# Patient Record
Sex: MALE | Race: WHITE | Employment: UNEMPLOYED | URBAN - METROPOLITAN AREA
[De-identification: names, ages, dates, MRNs, and addresses within clinical notes are randomized per-mention and may not be internally consistent; named-entity substitution may affect disease eponyms.]

---

## 2023-03-24 ENCOUNTER — APPOINTMENT (OUTPATIENT)
Dept: CT IMAGING | Age: 58
End: 2023-03-24
Payer: MEDICARE

## 2023-03-24 ENCOUNTER — APPOINTMENT (OUTPATIENT)
Dept: GENERAL RADIOLOGY | Age: 58
End: 2023-03-24
Payer: MEDICARE

## 2023-03-24 ENCOUNTER — HOSPITAL ENCOUNTER (OUTPATIENT)
Age: 58
Setting detail: OBSERVATION
LOS: 3 days | Discharge: NURSING, REHAB OR LONG-TERM CARE | End: 2023-03-27
Attending: EMERGENCY MEDICINE | Admitting: INTERNAL MEDICINE
Payer: MEDICARE

## 2023-03-24 DIAGNOSIS — S09.90XA CLOSED HEAD INJURY, INITIAL ENCOUNTER: ICD-10-CM

## 2023-03-24 DIAGNOSIS — S82.892A CLOSED FRACTURE OF LEFT ANKLE, INITIAL ENCOUNTER: Primary | ICD-10-CM

## 2023-03-24 DIAGNOSIS — S50.02XA CONTUSION OF LEFT ELBOW, INITIAL ENCOUNTER: ICD-10-CM

## 2023-03-24 DIAGNOSIS — R42 LIGHTHEADEDNESS: ICD-10-CM
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
## 2023-03-24 LAB
ANION GAP SERPL CALCULATED.3IONS-SCNC: 23 MMOL/L (ref 3–16)
BASOPHILS # BLD: 0 K/UL (ref 0–0.2)
BASOPHILS NFR BLD: 0.7 %
BUN SERPL-MCNC: 16 MG/DL (ref 7–20)
CALCIUM SERPL-MCNC: 9.1 MG/DL (ref 8.3–10.6)
CHLORIDE SERPL-SCNC: 90 MMOL/L (ref 99–110)
CO2 SERPL-SCNC: 19 MMOL/L (ref 21–32)
CREAT SERPL-MCNC: 0.8 MG/DL (ref 0.9–1.3)
DEPRECATED RDW RBC AUTO: 13.8 % (ref 12.4–15.4)
EOSINOPHIL # BLD: 0 K/UL (ref 0–0.6)
EOSINOPHIL NFR BLD: 0.1 %
ETHANOLAMINE SERPL-MCNC: NORMAL MG/DL (ref 0–0.08)
GFR SERPLBLD CREATININE-BSD FMLA CKD-EPI: >60 ML/MIN/{1.73_M2}
GLUCOSE BLD-MCNC: 379 MG/DL (ref 70–99)
GLUCOSE SERPL-MCNC: 353 MG/DL (ref 70–99)
HCT VFR BLD AUTO: 40.9 % (ref 40.5–52.5)
HGB BLD-MCNC: 13.8 G/DL (ref 13.5–17.5)
LYMPHOCYTES # BLD: 0.6 K/UL (ref 1–5.1)
LYMPHOCYTES NFR BLD: 8.2 %
MAGNESIUM SERPL-MCNC: 1.6 MG/DL (ref 1.8–2.4)
MCH RBC QN AUTO: 33.9 PG (ref 26–34)
MCHC RBC AUTO-ENTMCNC: 33.8 G/DL (ref 31–36)
MCV RBC AUTO: 100.4 FL (ref 80–100)
MONOCYTES # BLD: 0.8 K/UL (ref 0–1.3)
MONOCYTES NFR BLD: 11.8 %
NEUTROPHILS # BLD: 5.5 K/UL (ref 1.7–7.7)
NEUTROPHILS NFR BLD: 79.2 %
PERFORMED ON: ABNORMAL
PHOSPHATE SERPL-MCNC: 2.7 MG/DL (ref 2.5–4.9)
PLATELET # BLD AUTO: 178 K/UL (ref 135–450)
PMV BLD AUTO: 8.7 FL (ref 5–10.5)
POTASSIUM SERPL-SCNC: 5.5 MMOL/L (ref 3.5–5.1)
RBC # BLD AUTO: 4.07 M/UL (ref 4.2–5.9)
SODIUM SERPL-SCNC: 132 MMOL/L (ref 136–145)
WBC # BLD AUTO: 6.9 K/UL (ref 4–11)

## 2023-03-24 PROCEDURE — 73080 X-RAY EXAM OF ELBOW: CPT

## 2023-03-24 PROCEDURE — 83735 ASSAY OF MAGNESIUM: CPT

## 2023-03-24 PROCEDURE — 73630 X-RAY EXAM OF FOOT: CPT

## 2023-03-24 PROCEDURE — 6360000002 HC RX W HCPCS: Performed by: NURSE PRACTITIONER

## 2023-03-24 PROCEDURE — 2500000003 HC RX 250 WO HCPCS: Performed by: EMERGENCY MEDICINE

## 2023-03-24 PROCEDURE — 73610 X-RAY EXAM OF ANKLE: CPT

## 2023-03-24 PROCEDURE — 2500000003 HC RX 250 WO HCPCS: Performed by: STUDENT IN AN ORGANIZED HEALTH CARE EDUCATION/TRAINING PROGRAM

## 2023-03-24 PROCEDURE — 6370000000 HC RX 637 (ALT 250 FOR IP): Performed by: NURSE PRACTITIONER

## 2023-03-24 PROCEDURE — 85025 COMPLETE CBC W/AUTO DIFF WBC: CPT

## 2023-03-24 PROCEDURE — 84100 ASSAY OF PHOSPHORUS: CPT

## 2023-03-24 PROCEDURE — 82077 ASSAY SPEC XCP UR&BREATH IA: CPT

## 2023-03-24 PROCEDURE — 1200000000 HC SEMI PRIVATE

## 2023-03-24 PROCEDURE — 80048 BASIC METABOLIC PNL TOTAL CA: CPT

## 2023-03-24 PROCEDURE — 99285 EMERGENCY DEPT VISIT HI MDM: CPT

## 2023-03-24 PROCEDURE — 27788 TREATMENT OF ANKLE FRACTURE: CPT

## 2023-03-24 PROCEDURE — 70450 CT HEAD/BRAIN W/O DYE: CPT

## 2023-03-24 PROCEDURE — 72125 CT NECK SPINE W/O DYE: CPT

## 2023-03-24 RX ORDER — ATORVASTATIN CALCIUM 40 MG/1
40 TABLET, FILM COATED ORAL NIGHTLY
Status: DISCONTINUED | OUTPATIENT
Start: 2023-03-25 | End: 2023-03-27 | Stop reason: HOSPADM

## 2023-03-24 RX ORDER — INSULIN LISPRO 100 [IU]/ML
0-8 INJECTION, SOLUTION INTRAVENOUS; SUBCUTANEOUS
Status: DISCONTINUED | OUTPATIENT
Start: 2023-03-25 | End: 2023-03-27 | Stop reason: HOSPADM

## 2023-03-24 RX ORDER — METOPROLOL SUCCINATE 50 MG/1
50 TABLET, EXTENDED RELEASE ORAL DAILY
Status: DISCONTINUED | OUTPATIENT
Start: 2023-03-25 | End: 2023-03-27 | Stop reason: HOSPADM

## 2023-03-24 RX ORDER — ACETAMINOPHEN 325 MG/1
650 TABLET ORAL EVERY 6 HOURS PRN
Status: DISCONTINUED | OUTPATIENT
Start: 2023-03-24 | End: 2023-03-27 | Stop reason: HOSPADM

## 2023-03-24 RX ORDER — INSULIN LISPRO 100 [IU]/ML
10 INJECTION, SOLUTION INTRAVENOUS; SUBCUTANEOUS ONCE
Status: COMPLETED | OUTPATIENT
Start: 2023-03-24 | End: 2023-03-24

## 2023-03-24 RX ORDER — ONDANSETRON 2 MG/ML
4 INJECTION INTRAMUSCULAR; INTRAVENOUS EVERY 6 HOURS PRN
Status: DISCONTINUED | OUTPATIENT
Start: 2023-03-24 | End: 2023-03-27 | Stop reason: HOSPADM

## 2023-03-24 RX ORDER — ASPIRIN 81 MG/1
81 TABLET, CHEWABLE ORAL DAILY
Status: DISCONTINUED | OUTPATIENT
Start: 2023-03-25 | End: 2023-03-27 | Stop reason: HOSPADM

## 2023-03-24 RX ORDER — FINASTERIDE 5 MG/1
5 TABLET, FILM COATED ORAL DAILY
Status: DISCONTINUED | OUTPATIENT
Start: 2023-03-25 | End: 2023-03-27 | Stop reason: HOSPADM

## 2023-03-24 RX ORDER — AMLODIPINE BESYLATE 10 MG/1
10 TABLET ORAL DAILY
Status: DISCONTINUED | OUTPATIENT
Start: 2023-03-25 | End: 2023-03-27 | Stop reason: HOSPADM

## 2023-03-24 RX ORDER — KETAMINE HYDROCHLORIDE 50 MG/ML
1 INJECTION, SOLUTION, CONCENTRATE INTRAMUSCULAR; INTRAVENOUS ONCE
Status: COMPLETED | OUTPATIENT
Start: 2023-03-24 | End: 2023-03-24

## 2023-03-24 RX ORDER — LEVOTHYROXINE SODIUM 0.07 MG/1
150 TABLET ORAL DAILY
Status: DISCONTINUED | OUTPATIENT
Start: 2023-03-25 | End: 2023-03-27 | Stop reason: HOSPADM

## 2023-03-24 RX ORDER — INSULIN LISPRO 100 [IU]/ML
0-4 INJECTION, SOLUTION INTRAVENOUS; SUBCUTANEOUS NIGHTLY
Status: DISCONTINUED | OUTPATIENT
Start: 2023-03-25 | End: 2023-03-27 | Stop reason: HOSPADM

## 2023-03-24 RX ORDER — SODIUM CHLORIDE 0.9 % (FLUSH) 0.9 %
5-40 SYRINGE (ML) INJECTION EVERY 12 HOURS SCHEDULED
Status: DISCONTINUED | OUTPATIENT
Start: 2023-03-25 | End: 2023-03-27 | Stop reason: HOSPADM

## 2023-03-24 RX ORDER — DEXTROSE MONOHYDRATE 100 MG/ML
INJECTION, SOLUTION INTRAVENOUS CONTINUOUS PRN
Status: DISCONTINUED | OUTPATIENT
Start: 2023-03-24 | End: 2023-03-27 | Stop reason: HOSPADM

## 2023-03-24 RX ORDER — SODIUM CHLORIDE 0.9 % (FLUSH) 0.9 %
5-40 SYRINGE (ML) INJECTION PRN
Status: DISCONTINUED | OUTPATIENT
Start: 2023-03-24 | End: 2023-03-27 | Stop reason: HOSPADM

## 2023-03-24 RX ORDER — FLUVOXAMINE MALEATE 50 MG/1
300 TABLET, COATED ORAL NIGHTLY
Status: DISCONTINUED | OUTPATIENT
Start: 2023-03-25 | End: 2023-03-27 | Stop reason: HOSPADM

## 2023-03-24 RX ORDER — LIOTHYRONINE SODIUM 25 UG/1
12.5 TABLET ORAL DAILY
Status: DISCONTINUED | OUTPATIENT
Start: 2023-03-25 | End: 2023-03-27 | Stop reason: HOSPADM

## 2023-03-24 RX ORDER — SODIUM CHLORIDE 9 MG/ML
INJECTION, SOLUTION INTRAVENOUS PRN
Status: DISCONTINUED | OUTPATIENT
Start: 2023-03-24 | End: 2023-03-27 | Stop reason: HOSPADM

## 2023-03-24 RX ORDER — MAGNESIUM SULFATE 1 G/100ML
1000 INJECTION INTRAVENOUS ONCE
Status: COMPLETED | OUTPATIENT
Start: 2023-03-24 | End: 2023-03-24

## 2023-03-24 RX ORDER — ENOXAPARIN SODIUM 100 MG/ML
30 INJECTION SUBCUTANEOUS 2 TIMES DAILY
Status: DISCONTINUED | OUTPATIENT
Start: 2023-03-25 | End: 2023-03-27 | Stop reason: HOSPADM

## 2023-03-24 RX ORDER — PERPHENAZINE 2 MG/1
2 TABLET ORAL 2 TIMES DAILY WITH MEALS
Status: DISCONTINUED | OUTPATIENT
Start: 2023-03-25 | End: 2023-03-27 | Stop reason: HOSPADM

## 2023-03-24 RX ORDER — HYDROCODONE BITARTRATE AND ACETAMINOPHEN 5; 325 MG/1; MG/1
1 TABLET ORAL EVERY 6 HOURS PRN
Qty: 12 TABLET | Refills: 0 | Status: SHIPPED
Start: 2023-03-24 | End: 2023-03-27 | Stop reason: HOSPADM

## 2023-03-24 RX ORDER — ONDANSETRON 4 MG/1
4 TABLET, ORALLY DISINTEGRATING ORAL EVERY 8 HOURS PRN
Status: DISCONTINUED | OUTPATIENT
Start: 2023-03-24 | End: 2023-03-27 | Stop reason: HOSPADM

## 2023-03-24 RX ORDER — ACETAMINOPHEN 650 MG/1
650 SUPPOSITORY RECTAL EVERY 6 HOURS PRN
Status: DISCONTINUED | OUTPATIENT
Start: 2023-03-24 | End: 2023-03-27 | Stop reason: HOSPADM

## 2023-03-24 RX ORDER — OXCARBAZEPINE 300 MG/1
600 TABLET, FILM COATED ORAL DAILY
Status: DISCONTINUED | OUTPATIENT
Start: 2023-03-25 | End: 2023-03-27 | Stop reason: HOSPADM

## 2023-03-24 RX ORDER — HYDROCODONE BITARTRATE AND ACETAMINOPHEN 5; 325 MG/1; MG/1
1 TABLET ORAL EVERY 4 HOURS PRN
Status: DISCONTINUED | OUTPATIENT
Start: 2023-03-24 | End: 2023-03-25

## 2023-03-24 RX ORDER — INSULIN GLARGINE 100 [IU]/ML
40 INJECTION, SOLUTION SUBCUTANEOUS NIGHTLY
Status: DISCONTINUED | OUTPATIENT
Start: 2023-03-25 | End: 2023-03-27 | Stop reason: HOSPADM

## 2023-03-24 RX ORDER — LIDOCAINE HYDROCHLORIDE 20 MG/ML
5 INJECTION, SOLUTION INFILTRATION; PERINEURAL ONCE
Status: COMPLETED | OUTPATIENT
Start: 2023-03-24 | End: 2023-03-24

## 2023-03-24 RX ORDER — OXCARBAZEPINE 300 MG/1
1200 TABLET, FILM COATED ORAL NIGHTLY
Status: DISCONTINUED | OUTPATIENT
Start: 2023-03-25 | End: 2023-03-27 | Stop reason: HOSPADM

## 2023-03-24 RX ORDER — POLYETHYLENE GLYCOL 3350 17 G/17G
17 POWDER, FOR SOLUTION ORAL DAILY PRN
Status: DISCONTINUED | OUTPATIENT
Start: 2023-03-24 | End: 2023-03-27 | Stop reason: HOSPADM

## 2023-03-24 RX ADMIN — KETAMINE HYDROCHLORIDE 105 MG: 50 INJECTION, SOLUTION INTRAMUSCULAR; INTRAVENOUS at 18:18

## 2023-03-24 RX ADMIN — MAGNESIUM SULFATE HEPTAHYDRATE 1000 MG: 1 INJECTION, SOLUTION INTRAVENOUS at 22:20

## 2023-03-24 RX ADMIN — LIDOCAINE HYDROCHLORIDE 5 ML: 20 INJECTION, SOLUTION INFILTRATION; PERINEURAL at 18:15

## 2023-03-24 RX ADMIN — INSULIN LISPRO 10 UNITS: 100 INJECTION, SOLUTION INTRAVENOUS; SUBCUTANEOUS at 22:21

## 2023-03-24 ASSESSMENT — PAIN SCALES - GENERAL
PAINLEVEL_OUTOF10: 2
PAINLEVEL_OUTOF10: 2
PAINLEVEL_OUTOF10: 1
PAINLEVEL_OUTOF10: 5
PAINLEVEL_OUTOF10: 3
PAINLEVEL_OUTOF10: 2
PAINLEVEL_OUTOF10: 5
PAINLEVEL_OUTOF10: 2

## 2023-03-24 ASSESSMENT — PAIN DESCRIPTION - LOCATION: LOCATION: ANKLE

## 2023-03-24 ASSESSMENT — LIFESTYLE VARIABLES
HOW MANY STANDARD DRINKS CONTAINING ALCOHOL DO YOU HAVE ON A TYPICAL DAY: PATIENT DOES NOT DRINK
HOW OFTEN DO YOU HAVE A DRINK CONTAINING ALCOHOL: NEVER

## 2023-03-24 ASSESSMENT — PAIN - FUNCTIONAL ASSESSMENT: PAIN_FUNCTIONAL_ASSESSMENT: PREVENTS OR INTERFERES WITH ALL ACTIVE AND SOME PASSIVE ACTIVITIES

## 2023-03-24 ASSESSMENT — ENCOUNTER SYMPTOMS
GASTROINTESTINAL NEGATIVE: 1
BACK PAIN: 0
EYES NEGATIVE: 1
RESPIRATORY NEGATIVE: 1
SHORTNESS OF BREATH: 0
ABDOMINAL PAIN: 0

## 2023-03-24 ASSESSMENT — PAIN DESCRIPTION - ONSET: ONSET: ON-GOING

## 2023-03-24 ASSESSMENT — PAIN DESCRIPTION - ORIENTATION: ORIENTATION: LEFT

## 2023-03-24 ASSESSMENT — PAIN DESCRIPTION - FREQUENCY: FREQUENCY: CONTINUOUS

## 2023-03-24 ASSESSMENT — PAIN DESCRIPTION - DESCRIPTORS: DESCRIPTORS: ACHING

## 2023-03-24 ASSESSMENT — PAIN DESCRIPTION - PAIN TYPE: TYPE: ACUTE PAIN

## 2023-03-24 NOTE — ED PROVIDER NOTES
acute fracture dislocation or malalignment. No evidence of joint effusion. XR ANKLE LEFT (MIN 3 VIEWS)   Final Result   1. Mildly displaced fracture through the distal fibular with superficial soft   tissue swelling. No definite acute finding in the left foot. 2. Questionable osteophyte versus remote avulsion fracture at the medial base   of the 1st proximal phalanx. No swelling at this location. Correlate with   history and physical exam.         XR FOOT LEFT (MIN 3 VIEWS)   Final Result   1. Mildly displaced fracture through the distal fibular with superficial soft   tissue swelling. No definite acute finding in the left foot. 2. Questionable osteophyte versus remote avulsion fracture at the medial base   of the 1st proximal phalanx. No swelling at this location. Correlate with   history and physical exam.         CT Head W/O Contrast   Final Result   Head CT: No acute intracranial abnormality detected. Cervical spine CT: No acute fracture or traumatic malalignment. CT C-Spine W/O Contrast   Final Result   Head CT: No acute intracranial abnormality detected. Cervical spine CT: No acute fracture or traumatic malalignment. XR ELBOW LEFT (MIN 3 VIEWS)    Result Date: 3/24/2023  EXAMINATION: THREE XRAY VIEWS OF THE LEFT ELBOW 3/24/2023 3:21 pm COMPARISON: None. HISTORY: ORDERING SYSTEM PROVIDED HISTORY: Left elbow pain, direct trauma, swelling TECHNOLOGIST PROVIDED HISTORY: Reason for exam:->Left elbow pain, direct trauma, swelling Reason for Exam: Left elbow pain, direct trauma, swelling FINDINGS: No acute fracture dislocation or malalignment. No evidence of significant joint effusion. Radius and ulna and distal humerus appear intact. No acute fracture dislocation or malalignment. No evidence of joint effusion.      XR ANKLE LEFT (MIN 3 VIEWS)    Result Date: 3/24/2023  EXAMINATION: THREE XRAY VIEWS OF THE LEFT ANKLE 3/24/2023 6:15 pm COMPARISON: March 24, 2023 at

## 2023-03-24 NOTE — ED NOTES
Pt to ED via [facility] with c/o left ankle pain that started yesterday after the fall and c/o dizziness and blurred vision that started today at 1330. Pt denies symptoms at current time. Pt states he tripped over a cord and fell into a table yesterday. Pt states he did hit his head, denies loc. Imaging of left ankle was done today by [facility] foot and ankle which showed a fracture. Walking boot in place to E prior to ED arrival.  Pt alert and oriented x4, NIHSS 0.  ED MD Gabriele Kaur at bedside.         Rekha Smith RN  03/24/23 6833

## 2023-03-24 NOTE — CONSULTS
Department of Podiatry Consult Note  Attending       Reason for Consult: Ankle fracture left  Requesting Physician: Ángela Villegas MD    CHIEF COMPLAINT: Ankle fracture left    HISTORY OF PRESENT ILLNESS:                The patient is a 62 y.o. male with significant past medical history of diabetes type 1 poorly controlled, CAD, alcohol abuse. Patient was seen as an urgent add-on in my office today presented from a cab having had a fall last night hitting his head, left elbow, and twisting his left ankle now with severe left ankle pain and swelling. Upon arriving at my office patient was dizzy could not walk straight had difficulty standing up and did not feel well said his vision was blurred. After examination in my office with an in office x-ray although positioning was very poor because he could not cooperate for the x-ray and my machine is for weightbearing x-rays only, decision was made to call EMS and have him transported to [facility] emergency room for full trauma evaluation. Repeat x-rays were taken here at [facility] revealing a slightly displaced fibular fracture with lateral talar displacement of the left ankle. I was consulted to come and closed reduced this ankle. Long discussion was had with patient who does not wish to go to a skilled nursing facility and expressed he probably will not be compliant and does not want surgery at this time. Pain is a 10 out of 10.    past Medical History:        Diagnosis Date    Diabetes mellitus ([address]     Hyperlipidemia     Hypertension      Past Surgical History:    History reviewed. No pertinent surgical history. Current Medications:    Current Facility-Administered Medications: lidocaine 2 % injection 5 mL, 5 mL, IntraDERmal, Once  Allergies:   Lisinopril  Social History:    ETOH: Current heavy drinker admits to 6 drinks last night most likely drinks every day although he states usually every other day. Family History:   History reviewed.  No pertinent family

## 2023-03-24 NOTE — ED NOTES
Report given to receiving DEMETRIO Zuniga, all questions answered.       Ej Waddell RN  03/24/23 7694

## 2023-03-24 NOTE — DISCHARGE INSTRUCTIONS
Podiatry:   Keep leg dressed in compressive dressing in boot, NON WB to Left leg. F/U 1wk in podiatry, possible casting at that time.

## 2023-03-24 NOTE — ED NOTES
Dr. Domenica Hamman would like to be consulted upon work up/evaluation of pt, states she will be here shortly for another case.      Marshall Khan RN  03/24/23 9203

## 2023-03-25 ENCOUNTER — APPOINTMENT (OUTPATIENT)
Dept: MRI IMAGING | Age: 58
End: 2023-03-25
Payer: MEDICARE

## 2023-03-25 PROBLEM — E10.65 TYPE 1 DIABETES MELLITUS WITH HYPERGLYCEMIA (HCC): Status: ACTIVE | Noted: 2023-03-25

## 2023-03-25 PROBLEM — S82.892A CLOSED LEFT ANKLE FRACTURE: Status: ACTIVE | Noted: 2023-03-25
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
## 2023-03-25 LAB
ANION GAP SERPL CALCULATED.3IONS-SCNC: 13 MMOL/L (ref 3–16)
BASOPHILS # BLD: 0.1 K/UL (ref 0–0.2)
BASOPHILS NFR BLD: 1.2 %
BETA-HYDROXYBUTYRATE: 2.21 MMOL/L (ref 0–0.27)
BUN SERPL-MCNC: 18 MG/DL (ref 7–20)
CALCIUM SERPL-MCNC: 8.5 MG/DL (ref 8.3–10.6)
CHLORIDE SERPL-SCNC: 92 MMOL/L (ref 99–110)
CO2 SERPL-SCNC: 23 MMOL/L (ref 21–32)
CREAT SERPL-MCNC: 1 MG/DL (ref 0.9–1.3)
DEPRECATED RDW RBC AUTO: 13.7 % (ref 12.4–15.4)
EOSINOPHIL # BLD: 0 K/UL (ref 0–0.6)
EOSINOPHIL NFR BLD: 0.8 %
EST. AVERAGE GLUCOSE BLD GHB EST-MCNC: 139.9 MG/DL
GFR SERPLBLD CREATININE-BSD FMLA CKD-EPI: >60 ML/MIN/{1.73_M2}
GLUCOSE BLD-MCNC: 169 MG/DL (ref 70–99)
GLUCOSE BLD-MCNC: 414 MG/DL (ref 70–99)
GLUCOSE BLD-MCNC: 429 MG/DL (ref 70–99)
GLUCOSE SERPL-MCNC: 314 MG/DL (ref 70–99)
HBA1C MFR BLD: 6.5 %
HCT VFR BLD AUTO: 37.8 % (ref 40.5–52.5)
HGB BLD-MCNC: 12.8 G/DL (ref 13.5–17.5)
LYMPHOCYTES # BLD: 0.8 K/UL (ref 1–5.1)
LYMPHOCYTES NFR BLD: 13.9 %
MCH RBC QN AUTO: 33.9 PG (ref 26–34)
MCHC RBC AUTO-ENTMCNC: 33.9 G/DL (ref 31–36)
MCV RBC AUTO: 99.9 FL (ref 80–100)
MONOCYTES # BLD: 1 K/UL (ref 0–1.3)
MONOCYTES NFR BLD: 17.1 %
NEUTROPHILS # BLD: 3.8 K/UL (ref 1.7–7.7)
NEUTROPHILS NFR BLD: 67 %
PERFORMED ON: ABNORMAL
PLATELET # BLD AUTO: 178 K/UL (ref 135–450)
PMV BLD AUTO: 9.2 FL (ref 5–10.5)
POTASSIUM SERPL-SCNC: 4.7 MMOL/L (ref 3.5–5.1)
RBC # BLD AUTO: 3.79 M/UL (ref 4.2–5.9)
SODIUM SERPL-SCNC: 128 MMOL/L (ref 136–145)
WBC # BLD AUTO: 5.6 K/UL (ref 4–11)

## 2023-03-25 PROCEDURE — 97530 THERAPEUTIC ACTIVITIES: CPT

## 2023-03-25 PROCEDURE — 83036 HEMOGLOBIN GLYCOSYLATED A1C: CPT

## 2023-03-25 PROCEDURE — 85025 COMPLETE CBC W/AUTO DIFF WBC: CPT

## 2023-03-25 PROCEDURE — 6370000000 HC RX 637 (ALT 250 FOR IP): Performed by: INTERNAL MEDICINE

## 2023-03-25 PROCEDURE — 82010 KETONE BODYS QUAN: CPT

## 2023-03-25 PROCEDURE — 36415 COLL VENOUS BLD VENIPUNCTURE: CPT

## 2023-03-25 PROCEDURE — 70551 MRI BRAIN STEM W/O DYE: CPT

## 2023-03-25 PROCEDURE — 6370000000 HC RX 637 (ALT 250 FOR IP): Performed by: NURSE PRACTITIONER

## 2023-03-25 PROCEDURE — 6360000002 HC RX W HCPCS: Performed by: NURSE PRACTITIONER

## 2023-03-25 PROCEDURE — 2580000003 HC RX 258: Performed by: NURSE PRACTITIONER

## 2023-03-25 PROCEDURE — 97162 PT EVAL MOD COMPLEX 30 MIN: CPT

## 2023-03-25 PROCEDURE — 80048 BASIC METABOLIC PNL TOTAL CA: CPT

## 2023-03-25 PROCEDURE — 97166 OT EVAL MOD COMPLEX 45 MIN: CPT

## 2023-03-25 PROCEDURE — 97116 GAIT TRAINING THERAPY: CPT

## 2023-03-25 PROCEDURE — 1200000000 HC SEMI PRIVATE

## 2023-03-25 RX ORDER — INSULIN GLARGINE 100 [IU]/ML
40 INJECTION, SOLUTION SUBCUTANEOUS NIGHTLY
COMMUNITY
Start: 2023-01-12

## 2023-03-25 RX ORDER — ATORVASTATIN CALCIUM 40 MG/1
40 TABLET, FILM COATED ORAL DAILY
COMMUNITY
Start: 2023-02-26

## 2023-03-25 RX ORDER — TAMSULOSIN HYDROCHLORIDE 0.4 MG/1
0.4 CAPSULE ORAL NIGHTLY
COMMUNITY
Start: 2023-01-12

## 2023-03-25 RX ORDER — OXCARBAZEPINE 300 MG/1
1200 TABLET, FILM COATED ORAL NIGHTLY
COMMUNITY

## 2023-03-25 RX ORDER — INSULIN ASPART 100 [IU]/ML
20-30 INJECTION, SOLUTION INTRAVENOUS; SUBCUTANEOUS
Status: ON HOLD | COMMUNITY
Start: 2023-01-24 | End: 2023-03-27 | Stop reason: HOSPADM

## 2023-03-25 RX ORDER — PERPHENAZINE 2 MG/1
2 TABLET ORAL 2 TIMES DAILY
COMMUNITY
Start: 2023-03-07

## 2023-03-25 RX ORDER — FINASTERIDE 5 MG/1
5 TABLET, FILM COATED ORAL DAILY
COMMUNITY
Start: 2023-01-28

## 2023-03-25 RX ORDER — LIOTHYRONINE SODIUM 25 UG/1
12.5 TABLET ORAL DAILY
COMMUNITY
Start: 2023-01-12

## 2023-03-25 RX ORDER — AMLODIPINE BESYLATE 10 MG/1
10 TABLET ORAL DAILY
COMMUNITY
Start: 2023-01-12

## 2023-03-25 RX ORDER — HYDROCODONE BITARTRATE AND ACETAMINOPHEN 5; 325 MG/1; MG/1
1 TABLET ORAL EVERY 6 HOURS PRN
Status: DISCONTINUED | OUTPATIENT
Start: 2023-03-25 | End: 2023-03-27 | Stop reason: HOSPADM

## 2023-03-25 RX ORDER — OXCARBAZEPINE 600 MG/1
600 TABLET, FILM COATED ORAL EVERY MORNING
COMMUNITY

## 2023-03-25 RX ORDER — LEVOTHYROXINE SODIUM 0.15 MG/1
150 TABLET ORAL DAILY
COMMUNITY
Start: 2023-01-31

## 2023-03-25 RX ORDER — METOPROLOL SUCCINATE 50 MG/1
50 TABLET, EXTENDED RELEASE ORAL DAILY
COMMUNITY
Start: 2023-03-23

## 2023-03-25 RX ORDER — FLUVOXAMINE MALEATE 100 MG
300 TABLET ORAL NIGHTLY
COMMUNITY
Start: 2023-02-27

## 2023-03-25 RX ORDER — INSULIN LISPRO 100 [IU]/ML
10 INJECTION, SOLUTION INTRAVENOUS; SUBCUTANEOUS
Status: DISCONTINUED | OUTPATIENT
Start: 2023-03-25 | End: 2023-03-27 | Stop reason: HOSPADM

## 2023-03-25 RX ADMIN — LIOTHYRONINE SODIUM 12.5 MCG: 25 TABLET ORAL at 11:07

## 2023-03-25 RX ADMIN — INSULIN GLARGINE 40 UNITS: 100 INJECTION, SOLUTION SUBCUTANEOUS at 00:57

## 2023-03-25 RX ADMIN — INSULIN LISPRO 10 UNITS: 100 INJECTION, SOLUTION INTRAVENOUS; SUBCUTANEOUS at 17:50

## 2023-03-25 RX ADMIN — INSULIN LISPRO 8 UNITS: 100 INJECTION, SOLUTION INTRAVENOUS; SUBCUTANEOUS at 17:50

## 2023-03-25 RX ADMIN — INSULIN LISPRO 8 UNITS: 100 INJECTION, SOLUTION INTRAVENOUS; SUBCUTANEOUS at 12:26

## 2023-03-25 RX ADMIN — FLUVOXAMINE MALEATE 300 MG: 50 TABLET, COATED ORAL at 00:54

## 2023-03-25 RX ADMIN — OXCARBAZEPINE 600 MG: 300 TABLET, FILM COATED ORAL at 11:05

## 2023-03-25 RX ADMIN — METOPROLOL SUCCINATE 50 MG: 50 TABLET, EXTENDED RELEASE ORAL at 11:07

## 2023-03-25 RX ADMIN — OXCARBAZEPINE 1200 MG: 300 TABLET, FILM COATED ORAL at 00:55

## 2023-03-25 RX ADMIN — HYDROCODONE BITARTRATE AND ACETAMINOPHEN 1 TABLET: 5; 325 TABLET ORAL at 01:29

## 2023-03-25 RX ADMIN — ATORVASTATIN CALCIUM 40 MG: 40 TABLET, FILM COATED ORAL at 21:50

## 2023-03-25 RX ADMIN — HYDROCODONE BITARTRATE AND ACETAMINOPHEN 1 TABLET: 5; 325 TABLET ORAL at 11:08

## 2023-03-25 RX ADMIN — PERPHENAZINE 2 MG: 2 TABLET, FILM COATED ORAL at 17:49

## 2023-03-25 RX ADMIN — ENOXAPARIN SODIUM 30 MG: 100 INJECTION SUBCUTANEOUS at 21:48

## 2023-03-25 RX ADMIN — INSULIN LISPRO 10 UNITS: 100 INJECTION, SOLUTION INTRAVENOUS; SUBCUTANEOUS at 14:01

## 2023-03-25 RX ADMIN — ENOXAPARIN SODIUM 30 MG: 100 INJECTION SUBCUTANEOUS at 11:11

## 2023-03-25 RX ADMIN — SODIUM CHLORIDE, PRESERVATIVE FREE 10 ML: 5 INJECTION INTRAVENOUS at 21:51

## 2023-03-25 RX ADMIN — HYDROCODONE BITARTRATE AND ACETAMINOPHEN 1 TABLET: 5; 325 TABLET ORAL at 17:55

## 2023-03-25 RX ADMIN — LEVOTHYROXINE SODIUM 150 MCG: 0.07 TABLET ORAL at 06:21

## 2023-03-25 RX ADMIN — ASPIRIN 81 MG 81 MG: 81 TABLET ORAL at 11:04

## 2023-03-25 RX ADMIN — OXCARBAZEPINE 1200 MG: 300 TABLET, FILM COATED ORAL at 21:48

## 2023-03-25 RX ADMIN — SODIUM CHLORIDE, PRESERVATIVE FREE 10 ML: 5 INJECTION INTRAVENOUS at 12:31

## 2023-03-25 RX ADMIN — FINASTERIDE 5 MG: 5 TABLET, FILM COATED ORAL at 11:04

## 2023-03-25 RX ADMIN — INSULIN GLARGINE 40 UNITS: 100 INJECTION, SOLUTION SUBCUTANEOUS at 21:55

## 2023-03-25 RX ADMIN — AMLODIPINE BESYLATE 10 MG: 10 TABLET ORAL at 11:04

## 2023-03-25 RX ADMIN — INSULIN LISPRO 4 UNITS: 100 INJECTION, SOLUTION INTRAVENOUS; SUBCUTANEOUS at 00:57

## 2023-03-25 RX ADMIN — FLUVOXAMINE MALEATE 300 MG: 50 TABLET, COATED ORAL at 21:48

## 2023-03-25 RX ADMIN — PERPHENAZINE 2 MG: 2 TABLET, FILM COATED ORAL at 11:08

## 2023-03-25 RX ADMIN — ATORVASTATIN CALCIUM 40 MG: 40 TABLET, FILM COATED ORAL at 00:54

## 2023-03-25 ASSESSMENT — PAIN DESCRIPTION - FREQUENCY
FREQUENCY: CONTINUOUS

## 2023-03-25 ASSESSMENT — PAIN DESCRIPTION - ORIENTATION
ORIENTATION: LEFT

## 2023-03-25 ASSESSMENT — PAIN DESCRIPTION - PAIN TYPE
TYPE: ACUTE PAIN

## 2023-03-25 ASSESSMENT — PAIN - FUNCTIONAL ASSESSMENT
PAIN_FUNCTIONAL_ASSESSMENT: PREVENTS OR INTERFERES SOME ACTIVE ACTIVITIES AND ADLS

## 2023-03-25 ASSESSMENT — PAIN DESCRIPTION - LOCATION
LOCATION: ANKLE

## 2023-03-25 ASSESSMENT — PAIN SCALES - GENERAL
PAINLEVEL_OUTOF10: 6
PAINLEVEL_OUTOF10: 9
PAINLEVEL_OUTOF10: 0
PAINLEVEL_OUTOF10: 5
PAINLEVEL_OUTOF10: 0
PAINLEVEL_OUTOF10: 4

## 2023-03-25 ASSESSMENT — PAIN DESCRIPTION - DESCRIPTORS
DESCRIPTORS: THROBBING
DESCRIPTORS: ACHING
DESCRIPTORS: THROBBING
DESCRIPTORS: ACHING

## 2023-03-25 ASSESSMENT — PAIN DESCRIPTION - ONSET
ONSET: ON-GOING

## 2023-03-25 NOTE — ED NOTES
Report called to [place], [place]. Opportunity to answer questions. VSS on room air. Pt is alert and oriented. Nurse made aware of pt's baseline tremors and anxiety.       Bossman Sevilla RN  03/24/23 4599

## 2023-03-25 NOTE — CONSULTS
Neurology Consult Note  Reason for Consult: dizziness  Referring Provider: Zen Perez, * asked me to see Man Crain in consultation. History of Present Illness:  Man Crain is a 62 y.o. male who presents with dizziness that led to a fall and subsequent fracture of left ankle. No headaches. No loss of vision. No double vision. Dizziness is better today. PT/OT were working with patient as I entered the room--walking--teaching how to non-weight bear. Patient with history of heavy alcohol use. Also DM with elevated blood sugar. Medical History:  Past Medical History:   Diagnosis Date    Diabetes mellitus ([facility] [address]     Hyperlipidemia     Hypertension      History reviewed. No pertinent surgical history. No medications prior to admission. Allergies   Allergen Reactions    Lisinopril Other (See Comments)     Pt unaware of symptoms  Other reaction(s): Other (See Comments), Other (See Comments)  Pt unaware of symptoms  hyperkalemia  hyperkalemia       History reviewed. No pertinent family history. Social History     Tobacco Use   Smoking Status Never   Smokeless Tobacco Never     Social History     Substance and Sexual Activity   Drug Use Not Currently     Social History     Substance and Sexual Activity   Alcohol Use Yes    Comment: drinks 1-2 times/week       ROS:  Constitutional- No weight loss or fevers  Eyes- No diplopia. No photophobia. No loss of vision. No blurry vision  Ears/nose/throat- . No ringing in ears. +hearing loss  Cardiovascular- No palpitations. No chest pain  Respiratory- No dyspnea. No Cough  Gastrointestinal- No Abdominal pain. No Vomiting. Genitourinary- No incontinence. No urinary retention  Musculoskeletal- + myalgia. +left ankle pain +arthralgia  Psychiatric- No depression. No anxiety  Hematologic- No bleeding difficulty. No bruising  Neurologic- + weakness. No Headache. No memory loss.   No numbness +dizziness      Exam:  Blood pressure 124/66,

## 2023-03-25 NOTE — H&P
Accu-Chek, hypoglycemic protocol  Mental health medications: Continued    Hypomagnesia M: 1.6-> repletion initiated  Recheck in a.m. Hypothyroid: Continue  liothyronine and levothyroxine as per outpatient regmien  CAD: Continue aspirin and statin therapy, amlodipine and metoprolol as well      DVT Prophylaxis: Lovenox  Diet: ADULT DIET; Regular; 4 carb choices (60 gm/meal)  Code Status: Full Code  PT/OT Eval Status: Independent prior to injury    Dispo - admit, inpt       Julia Im Jennie, APRN - CNP    Thank you Niko Sinclair for the opportunity to be involved in this patient's care. If you have any questions or concerns please feel free to contact me at [phone]. This dictation was performed with a verbal recognition program (DRAGON) and it was checked for errors. It is possible that there are still dictated errors within this office note. If so, please bring any errors to my attention for an addendum. All efforts were made to ensure that this office note is accurate.

## 2023-03-26 LAB
ANION GAP SERPL CALCULATED.3IONS-SCNC: 15 MMOL/L (ref 3–16)
BASOPHILS # BLD: 0.1 K/UL (ref 0–0.2)
BASOPHILS NFR BLD: 1.3 %
BUN SERPL-MCNC: 14 MG/DL (ref 7–20)
CALCIUM SERPL-MCNC: 8.6 MG/DL (ref 8.3–10.6)
CHLORIDE SERPL-SCNC: 98 MMOL/L (ref 99–110)
CO2 SERPL-SCNC: 23 MMOL/L (ref 21–32)
CREAT SERPL-MCNC: 0.8 MG/DL (ref 0.9–1.3)
DEPRECATED RDW RBC AUTO: 13.8 % (ref 12.4–15.4)
EOSINOPHIL # BLD: 0.2 K/UL (ref 0–0.6)
EOSINOPHIL NFR BLD: 3.7 %
GFR SERPLBLD CREATININE-BSD FMLA CKD-EPI: >60 ML/MIN/{1.73_M2}
GLUCOSE BLD-MCNC: 106 MG/DL (ref 70–99)
GLUCOSE BLD-MCNC: 153 MG/DL (ref 70–99)
GLUCOSE BLD-MCNC: 205 MG/DL (ref 70–99)
GLUCOSE BLD-MCNC: 240 MG/DL (ref 70–99)
GLUCOSE BLD-MCNC: 388 MG/DL (ref 70–99)
GLUCOSE SERPL-MCNC: 276 MG/DL (ref 70–99)
HCT VFR BLD AUTO: 38.1 % (ref 40.5–52.5)
HGB BLD-MCNC: 12.8 G/DL (ref 13.5–17.5)
LYMPHOCYTES # BLD: 1.1 K/UL (ref 1–5.1)
LYMPHOCYTES NFR BLD: 21.6 %
MCH RBC QN AUTO: 33.5 PG (ref 26–34)
MCHC RBC AUTO-ENTMCNC: 33.6 G/DL (ref 31–36)
MCV RBC AUTO: 99.6 FL (ref 80–100)
MONOCYTES # BLD: 0.6 K/UL (ref 0–1.3)
MONOCYTES NFR BLD: 12.9 %
NEUTROPHILS # BLD: 3 K/UL (ref 1.7–7.7)
NEUTROPHILS NFR BLD: 60.5 %
PERFORMED ON: ABNORMAL
PLATELET # BLD AUTO: 187 K/UL (ref 135–450)
PMV BLD AUTO: 8.9 FL (ref 5–10.5)
POTASSIUM SERPL-SCNC: 4.2 MMOL/L (ref 3.5–5.1)
RBC # BLD AUTO: 3.83 M/UL (ref 4.2–5.9)
SODIUM SERPL-SCNC: 136 MMOL/L (ref 136–145)
WBC # BLD AUTO: 5 K/UL (ref 4–11)

## 2023-03-26 PROCEDURE — 6370000000 HC RX 637 (ALT 250 FOR IP): Performed by: NURSE PRACTITIONER

## 2023-03-26 PROCEDURE — 36415 COLL VENOUS BLD VENIPUNCTURE: CPT

## 2023-03-26 PROCEDURE — 94760 N-INVAS EAR/PLS OXIMETRY 1: CPT

## 2023-03-26 PROCEDURE — 6360000002 HC RX W HCPCS: Performed by: NURSE PRACTITIONER

## 2023-03-26 PROCEDURE — 2580000003 HC RX 258: Performed by: NURSE PRACTITIONER

## 2023-03-26 PROCEDURE — 80048 BASIC METABOLIC PNL TOTAL CA: CPT

## 2023-03-26 PROCEDURE — 1200000000 HC SEMI PRIVATE

## 2023-03-26 PROCEDURE — 85025 COMPLETE CBC W/AUTO DIFF WBC: CPT

## 2023-03-26 PROCEDURE — 6370000000 HC RX 637 (ALT 250 FOR IP): Performed by: INTERNAL MEDICINE

## 2023-03-26 RX ADMIN — HYDROCODONE BITARTRATE AND ACETAMINOPHEN 1 TABLET: 5; 325 TABLET ORAL at 07:56

## 2023-03-26 RX ADMIN — INSULIN LISPRO 10 UNITS: 100 INJECTION, SOLUTION INTRAVENOUS; SUBCUTANEOUS at 07:51

## 2023-03-26 RX ADMIN — ENOXAPARIN SODIUM 30 MG: 100 INJECTION SUBCUTANEOUS at 08:03

## 2023-03-26 RX ADMIN — FINASTERIDE 5 MG: 5 TABLET, FILM COATED ORAL at 07:57

## 2023-03-26 RX ADMIN — HYDROCODONE BITARTRATE AND ACETAMINOPHEN 1 TABLET: 5; 325 TABLET ORAL at 00:39

## 2023-03-26 RX ADMIN — POLYETHYLENE GLYCOL 3350 17 G: 17 POWDER, FOR SOLUTION ORAL at 18:44

## 2023-03-26 RX ADMIN — INSULIN GLARGINE 40 UNITS: 100 INJECTION, SOLUTION SUBCUTANEOUS at 21:24

## 2023-03-26 RX ADMIN — FLUVOXAMINE MALEATE 300 MG: 50 TABLET, COATED ORAL at 21:20

## 2023-03-26 RX ADMIN — HYDROCODONE BITARTRATE AND ACETAMINOPHEN 1 TABLET: 5; 325 TABLET ORAL at 21:20

## 2023-03-26 RX ADMIN — LIOTHYRONINE SODIUM 12.5 MCG: 25 TABLET ORAL at 07:58

## 2023-03-26 RX ADMIN — OXCARBAZEPINE 600 MG: 300 TABLET, FILM COATED ORAL at 07:56

## 2023-03-26 RX ADMIN — INSULIN LISPRO 10 UNITS: 100 INJECTION, SOLUTION INTRAVENOUS; SUBCUTANEOUS at 14:11

## 2023-03-26 RX ADMIN — INSULIN LISPRO 2 UNITS: 100 INJECTION, SOLUTION INTRAVENOUS; SUBCUTANEOUS at 07:51

## 2023-03-26 RX ADMIN — LEVOTHYROXINE SODIUM 150 MCG: 0.07 TABLET ORAL at 06:29

## 2023-03-26 RX ADMIN — INSULIN LISPRO 10 UNITS: 100 INJECTION, SOLUTION INTRAVENOUS; SUBCUTANEOUS at 17:18

## 2023-03-26 RX ADMIN — PERPHENAZINE 2 MG: 2 TABLET, FILM COATED ORAL at 17:20

## 2023-03-26 RX ADMIN — SODIUM CHLORIDE, PRESERVATIVE FREE 10 ML: 5 INJECTION INTRAVENOUS at 21:00

## 2023-03-26 RX ADMIN — ATORVASTATIN CALCIUM 40 MG: 40 TABLET, FILM COATED ORAL at 21:20

## 2023-03-26 RX ADMIN — INSULIN LISPRO 2 UNITS: 100 INJECTION, SOLUTION INTRAVENOUS; SUBCUTANEOUS at 14:11

## 2023-03-26 RX ADMIN — OXCARBAZEPINE 1200 MG: 300 TABLET, FILM COATED ORAL at 21:20

## 2023-03-26 RX ADMIN — SODIUM CHLORIDE, PRESERVATIVE FREE 10 ML: 5 INJECTION INTRAVENOUS at 07:58

## 2023-03-26 RX ADMIN — ASPIRIN 81 MG 81 MG: 81 TABLET ORAL at 07:57

## 2023-03-26 RX ADMIN — AMLODIPINE BESYLATE 10 MG: 10 TABLET ORAL at 07:57

## 2023-03-26 RX ADMIN — HYDROCODONE BITARTRATE AND ACETAMINOPHEN 1 TABLET: 5; 325 TABLET ORAL at 14:11

## 2023-03-26 RX ADMIN — METOPROLOL SUCCINATE 50 MG: 50 TABLET, EXTENDED RELEASE ORAL at 07:57

## 2023-03-26 RX ADMIN — PERPHENAZINE 2 MG: 2 TABLET, FILM COATED ORAL at 07:58

## 2023-03-26 RX ADMIN — ENOXAPARIN SODIUM 30 MG: 100 INJECTION SUBCUTANEOUS at 21:20

## 2023-03-26 ASSESSMENT — PAIN DESCRIPTION - ORIENTATION
ORIENTATION: LEFT

## 2023-03-26 ASSESSMENT — PAIN DESCRIPTION - FREQUENCY
FREQUENCY: CONTINUOUS

## 2023-03-26 ASSESSMENT — PAIN SCALES - GENERAL
PAINLEVEL_OUTOF10: 10
PAINLEVEL_OUTOF10: 0
PAINLEVEL_OUTOF10: 4
PAINLEVEL_OUTOF10: 10
PAINLEVEL_OUTOF10: 5
PAINLEVEL_OUTOF10: 0

## 2023-03-26 ASSESSMENT — PAIN DESCRIPTION - DESCRIPTORS
DESCRIPTORS: THROBBING

## 2023-03-26 ASSESSMENT — PAIN DESCRIPTION - PAIN TYPE
TYPE: ACUTE PAIN

## 2023-03-26 ASSESSMENT — PAIN - FUNCTIONAL ASSESSMENT
PAIN_FUNCTIONAL_ASSESSMENT: PREVENTS OR INTERFERES SOME ACTIVE ACTIVITIES AND ADLS

## 2023-03-26 ASSESSMENT — PAIN DESCRIPTION - ONSET
ONSET: ON-GOING

## 2023-03-26 ASSESSMENT — PAIN DESCRIPTION - LOCATION
LOCATION: ANKLE

## 2023-03-26 NOTE — PLAN OF CARE
Problem: Discharge Planning  Goal: Discharge to home or other facility with appropriate resources  3/25/2023 2025 by Sim Sorenson RN  Outcome: Progressing     Problem: Pain  Goal: Verbalizes/displays adequate comfort level or baseline comfort level  3/26/2023 0705 by Natalia Meraz  Outcome: Progressing  3/25/2023 2025 by Sim Sorenson RN  Outcome: Progressing     Problem: Safety - Adult  Goal: Free from fall injury  3/26/2023 0705 by Natalia Meraz  Outcome: Progressing  Flowsheets (Taken 3/25/2023 2334)  Free From Fall Injury: Instruct family/caregiver on patient safety  3/25/2023 2025 by Sim Sorenson RN  Outcome: Progressing

## 2023-03-27 VITALS
HEART RATE: 76 BPM | TEMPERATURE: 98.1 F | HEIGHT: 73 IN | RESPIRATION RATE: 16 BRPM | SYSTOLIC BLOOD PRESSURE: 160 MMHG | DIASTOLIC BLOOD PRESSURE: 85 MMHG | OXYGEN SATURATION: 98 % | BODY MASS INDEX: 29.36 KG/M2 | WEIGHT: 221.56 LBS

## 2023-03-27 PROBLEM — R53.1 WEAKNESS: Status: ACTIVE | Noted: 2023-03-27

## 2023-03-27 LAB
ANION GAP SERPL CALCULATED.3IONS-SCNC: 8 MMOL/L (ref 3–16)
BASOPHILS # BLD: 0 K/UL (ref 0–0.2)
BASOPHILS NFR BLD: 1.1 %
BUN SERPL-MCNC: 11 MG/DL (ref 7–20)
CALCIUM SERPL-MCNC: 8.8 MG/DL (ref 8.3–10.6)
CHLORIDE SERPL-SCNC: 100 MMOL/L (ref 99–110)
CO2 SERPL-SCNC: 30 MMOL/L (ref 21–32)
CREAT SERPL-MCNC: 0.8 MG/DL (ref 0.9–1.3)
DEPRECATED RDW RBC AUTO: 13.6 % (ref 12.4–15.4)
EOSINOPHIL # BLD: 0.3 K/UL (ref 0–0.6)
EOSINOPHIL NFR BLD: 6.4 %
GFR SERPLBLD CREATININE-BSD FMLA CKD-EPI: >60 ML/MIN/{1.73_M2}
GLUCOSE BLD-MCNC: 212 MG/DL (ref 70–99)
GLUCOSE BLD-MCNC: 237 MG/DL (ref 70–99)
GLUCOSE BLD-MCNC: 86 MG/DL (ref 70–99)
GLUCOSE SERPL-MCNC: 105 MG/DL (ref 70–99)
HCT VFR BLD AUTO: 38.7 % (ref 40.5–52.5)
HGB BLD-MCNC: 13.2 G/DL (ref 13.5–17.5)
LYMPHOCYTES # BLD: 0.9 K/UL (ref 1–5.1)
LYMPHOCYTES NFR BLD: 22 %
MCH RBC QN AUTO: 33.7 PG (ref 26–34)
MCHC RBC AUTO-ENTMCNC: 34 G/DL (ref 31–36)
MCV RBC AUTO: 99.1 FL (ref 80–100)
MONOCYTES # BLD: 0.5 K/UL (ref 0–1.3)
MONOCYTES NFR BLD: 12.8 %
NEUTROPHILS # BLD: 2.5 K/UL (ref 1.7–7.7)
NEUTROPHILS NFR BLD: 57.7 %
PERFORMED ON: ABNORMAL
PERFORMED ON: ABNORMAL
PERFORMED ON: NORMAL
PLATELET # BLD AUTO: 207 K/UL (ref 135–450)
PMV BLD AUTO: 8.3 FL (ref 5–10.5)
POTASSIUM SERPL-SCNC: 4.7 MMOL/L (ref 3.5–5.1)
RBC # BLD AUTO: 3.91 M/UL (ref 4.2–5.9)
SARS-COV-2 RDRP RESP QL NAA+PROBE: NOT DETECTED
SODIUM SERPL-SCNC: 138 MMOL/L (ref 136–145)
WBC # BLD AUTO: 4.3 K/UL (ref 4–11)

## 2023-03-27 PROCEDURE — 80048 BASIC METABOLIC PNL TOTAL CA: CPT

## 2023-03-27 PROCEDURE — 6360000002 HC RX W HCPCS: Performed by: NURSE PRACTITIONER

## 2023-03-27 PROCEDURE — 94760 N-INVAS EAR/PLS OXIMETRY 1: CPT

## 2023-03-27 PROCEDURE — 87635 SARS-COV-2 COVID-19 AMP PRB: CPT

## 2023-03-27 PROCEDURE — 85025 COMPLETE CBC W/AUTO DIFF WBC: CPT

## 2023-03-27 PROCEDURE — 97530 THERAPEUTIC ACTIVITIES: CPT

## 2023-03-27 PROCEDURE — G0378 HOSPITAL OBSERVATION PER HR: HCPCS

## 2023-03-27 PROCEDURE — 6370000000 HC RX 637 (ALT 250 FOR IP): Performed by: NURSE PRACTITIONER

## 2023-03-27 PROCEDURE — 2580000003 HC RX 258: Performed by: NURSE PRACTITIONER

## 2023-03-27 PROCEDURE — 6370000000 HC RX 637 (ALT 250 FOR IP): Performed by: INTERNAL MEDICINE

## 2023-03-27 PROCEDURE — 36415 COLL VENOUS BLD VENIPUNCTURE: CPT

## 2023-03-27 RX ORDER — HYDROCODONE BITARTRATE AND ACETAMINOPHEN 5; 325 MG/1; MG/1
1 TABLET ORAL EVERY 6 HOURS PRN
Qty: 12 TABLET | Refills: 0 | Status: SHIPPED | OUTPATIENT
Start: 2023-03-27 | End: 2023-03-30

## 2023-03-27 RX ADMIN — FINASTERIDE 5 MG: 5 TABLET, FILM COATED ORAL at 08:47

## 2023-03-27 RX ADMIN — HYDROCODONE BITARTRATE AND ACETAMINOPHEN 1 TABLET: 5; 325 TABLET ORAL at 08:47

## 2023-03-27 RX ADMIN — INSULIN LISPRO 10 UNITS: 100 INJECTION, SOLUTION INTRAVENOUS; SUBCUTANEOUS at 13:49

## 2023-03-27 RX ADMIN — LIOTHYRONINE SODIUM 12.5 MCG: 25 TABLET ORAL at 08:47

## 2023-03-27 RX ADMIN — INSULIN LISPRO 2 UNITS: 100 INJECTION, SOLUTION INTRAVENOUS; SUBCUTANEOUS at 18:37

## 2023-03-27 RX ADMIN — PERPHENAZINE 2 MG: 2 TABLET, FILM COATED ORAL at 18:35

## 2023-03-27 RX ADMIN — LEVOTHYROXINE SODIUM 150 MCG: 0.07 TABLET ORAL at 06:13

## 2023-03-27 RX ADMIN — INSULIN LISPRO 2 UNITS: 100 INJECTION, SOLUTION INTRAVENOUS; SUBCUTANEOUS at 13:48

## 2023-03-27 RX ADMIN — SODIUM CHLORIDE, PRESERVATIVE FREE 10 ML: 5 INJECTION INTRAVENOUS at 08:47

## 2023-03-27 RX ADMIN — ASPIRIN 81 MG 81 MG: 81 TABLET ORAL at 08:47

## 2023-03-27 RX ADMIN — PERPHENAZINE 2 MG: 2 TABLET, FILM COATED ORAL at 08:47

## 2023-03-27 RX ADMIN — INSULIN LISPRO 10 UNITS: 100 INJECTION, SOLUTION INTRAVENOUS; SUBCUTANEOUS at 18:37

## 2023-03-27 RX ADMIN — AMLODIPINE BESYLATE 10 MG: 10 TABLET ORAL at 08:47

## 2023-03-27 RX ADMIN — ENOXAPARIN SODIUM 30 MG: 100 INJECTION SUBCUTANEOUS at 08:48

## 2023-03-27 RX ADMIN — HYDROCODONE BITARTRATE AND ACETAMINOPHEN 1 TABLET: 5; 325 TABLET ORAL at 18:35

## 2023-03-27 RX ADMIN — OXCARBAZEPINE 600 MG: 300 TABLET, FILM COATED ORAL at 08:47

## 2023-03-27 RX ADMIN — METOPROLOL SUCCINATE 50 MG: 50 TABLET, EXTENDED RELEASE ORAL at 08:47

## 2023-03-27 ASSESSMENT — PAIN SCALES - GENERAL
PAINLEVEL_OUTOF10: 5
PAINLEVEL_OUTOF10: 0
PAINLEVEL_OUTOF10: 0
PAINLEVEL_OUTOF10: 5

## 2023-03-27 ASSESSMENT — PAIN DESCRIPTION - ONSET
ONSET: ON-GOING
ONSET: ON-GOING

## 2023-03-27 ASSESSMENT — PAIN DESCRIPTION - FREQUENCY
FREQUENCY: CONTINUOUS
FREQUENCY: CONTINUOUS

## 2023-03-27 ASSESSMENT — PAIN - FUNCTIONAL ASSESSMENT
PAIN_FUNCTIONAL_ASSESSMENT: PREVENTS OR INTERFERES SOME ACTIVE ACTIVITIES AND ADLS
PAIN_FUNCTIONAL_ASSESSMENT: PREVENTS OR INTERFERES SOME ACTIVE ACTIVITIES AND ADLS

## 2023-03-27 ASSESSMENT — PAIN DESCRIPTION - ORIENTATION
ORIENTATION: LEFT
ORIENTATION: LEFT

## 2023-03-27 ASSESSMENT — PAIN DESCRIPTION - DESCRIPTORS
DESCRIPTORS: ACHING
DESCRIPTORS: ACHING

## 2023-03-27 ASSESSMENT — PAIN DESCRIPTION - LOCATION
LOCATION: ANKLE
LOCATION: ANKLE

## 2023-03-27 ASSESSMENT — PAIN DESCRIPTION - PAIN TYPE
TYPE: ACUTE PAIN
TYPE: ACUTE PAIN

## 2023-03-27 NOTE — DISCHARGE INSTR - COC
Continuity of Care Form    Patient Name: Taya Mendoza   :  1965  MRN:  [id number]    Admit date:  3/24/2023  Discharge date:  3/27/23    Code Status Order: Full Code   Advance Directives:     Admitting Physician:  Guanako Rowley MD  PCP: Toby Coyle    Discharging Nurse: Linda Harman [facility] Unit/Room#: R6I-3995/3011-70  Discharging Unit Phone Number: [phone]    Emergency Contact:   Extended Emergency Contact Information  Primary Emergency Contact: Aubree Cavazos  Address: [address] [place] [address] Phone: [phone]  Work Phone: [phone]  Relation: Parent  Secondary Emergency Contact: JIN Pollock [address] Phone: [phone]  Relation: Brother/Sister    Past Surgical History:  History reviewed. No pertinent surgical history. Immunization History:   Immunization History   Administered Date(s) Administered    COVID-19, MODERNA BLUE border, Primary or Immunocompromised, (age 12y+), IM, 100 mcg/0.5mL 2021    COVID-19, PFIZER GRAY top, DO NOT Dilute, (age 15 y+), IM, 30 mcg/0.3 mL 2022    COVID-19, PFIZER PURPLE top, DILUTE for use, (age 15 y+), 30mcg/0.3mL 10/25/2021       Active Problems:  Patient Active Problem List   Diagnosis Code    Dizziness R42    Type 1 diabetes mellitus with hyperglycemia ([facility] [address] E10.65    Closed left ankle fracture S82.892A    Fall at home, initial encounter [address] XXXA, Y92.009    Weakness R53.1       Isolation/Infection:   Isolation            No Isolation          Patient Infection Status       None to display            Nurse Assessment:  Last Vital Signs: /80   Pulse 68   Temp 97.6 °F (36.4 °C) (Oral)   Resp 16   Ht 6' 0.5\" (1.842 m)   Wt 221 lb 9 oz (100.5 kg)   SpO2 96%   BMI 29.64 kg/m²     Last documented pain score (0-10 scale): Pain Level: 0  Last Weight:   Wt Readings from Last 1 Encounters:   23

## 2023-03-27 NOTE — PROGRESS NOTES
4 Eyes Admission Assessment      I agree as the admission nurse that 2 RN's have performed a thorough Head to Toe Skin Assessment on the patient. ALL assessment sites listed below have been assessed on admission.                   Areas assessed by both nurses:   [x]   Head, Face, and Ears   [x]   Shoulders, Back, and Chest  [x]   Arms, Elbows, and Hands   [x]   Coccyx, Sacrum, and Ischum  [x]   Legs, Feet, and Heels                        Does the Patient have Skin Breakdown?  No         Miguel Prevention initiated:  No  Wound Care Orders initiated:  NA      [facility] nurse consulted for Pressure Injury (Stage 3,4, Unstageable, DTI, NWPT, and Complex wounds):  NA       Nurse 1 eSignature: Electronically signed by LEONARD RIVERA on 3/25/2023 at 2:26 AM      **SHARE this note so that the co-signing nurse is able to place an eSignature**     Nurse 2 eSignature: {Esignature:[id number]}    
D: Dr. Waylon Martinez, with podiatry, called this RN and asked me to cancel the consult for the orthopedic surgeon as she is covering care for pt and if she feels like pt needs surgery, then she will manage pt's care. She asked this RN to communicate this to Dr. Silvana Stereter A: this RN canceled consult per Dr. Jaleesa stovall and this RN to send secure message to Dr. Silvana Streeter R: will continue to monitor pt    Pt returned from MRI and medications given. PT/OT evaluated pt and neurology came to bedside and explained MRI to pt. MRI negative for acute stroke.
D: pt's BS is 429, 8 units of humalog given per sliding scale. pt said that his vision is blurry. He missed the morning insulin as he went for a MRI.  Also, pt stated that he takes 20-30 units of Novolog at home with each meal. A: this RN sent secure message to Dr. Say Williamson with assessment findings and communication with pt R: will continue to monitor pt
D: pt's BS was 414 this evening. sliding scale and prandial given so that pt received 18 units. perhaps pt needs a higher sliding scale with meals tomorrow. pt's vision is no longer blurry.  A: this RN sent secure message to Dr. Samuel Judd R: will continue to monitor pt
Dr. Harjinder Bell ordered additional insulin for pt.
Hospitalist Progress Note      PCP: Melody Crystal    Date of Admission: 3/24/2023    Chief Complaint:   Chief Complaint   Patient presents with    Fall    Blurred Vision    Dizziness     Pt to ED via [address] [facility] with c/o left ankle pain that started yesterday after the fall and c/o dizziness and blurred vision that started today at 1330. Sherryle Moder Pt states he tripped over a cord and fell into a table yesterday. Pt states he did hit his head, denies loc. Imaging of left ankle was done today by [facility] foot and ankle which showed a fracture. Hospital Course: The patient is a 62 y.o. male who presents to [facility] with PMHx: Very extensive:    Patient presented via EMS to the  emergency department from [facility] [place] office: For a left ankle fracture     Patient reports that yesterday at home he tripped over a cord and fell causing injury to the left ankle. Denying loss of consciousness, does report that he also was suffering from some blurred vision and dizziness prior to the fall.        Medications:  Reviewed    Infusion Medications    dextrose      sodium chloride       Scheduled Medications    insulin lispro  10 Units SubCUTAneous TID WC    amLODIPine  10 mg Oral Daily    aspirin  81 mg Oral Daily    atorvastatin  40 mg Oral Nightly    finasteride  5 mg Oral Daily    fluvoxaMINE  300 mg Oral Nightly    insulin glargine  40 Units SubCUTAneous Nightly    levothyroxine  150 mcg Oral Daily    liothyronine  12.5 mcg Oral Daily    metoprolol succinate  50 mg Oral Daily    insulin lispro  0-8 Units SubCUTAneous TID WC    insulin lispro  0-4 Units SubCUTAneous Nightly    OXcarbazepine  600 mg Oral Daily    OXcarbazepine  1,200 mg Oral Nightly    perphenazine  2 mg Oral BID WC    sodium chloride flush  5-40 mL IntraVENous 2 times per day    enoxaparin  30 mg SubCUTAneous BID     PRN Meds: HYDROcodone 5 mg - acetaminophen, glucose, dextrose bolus **OR** dextrose bolus, glucagon (rDNA),
Medication Reconciliation    List of medications patient is currently taking is complete. Source of information: 1.  Conversation with patient at bedside                                      2. EPIC records        Marc Laguerre, [facility]   3/25/2023  1:13 PM
Patient admitted to room 3110 for Left ankle fracture. Currently is resting in bed. Alert and oriented X 4. Complaining of pain, PRN pain medication given per order (see MAR). PM medications administered as ordered without complaint (see eMAR). IV capped and flushed. Assessment complete. Patient instructed to call staff when needs to use the restroom so we can use a stedy. VSS stable at this time. All patient needs are met at this time. Fall precautions are in place. Call light is in reach.
Patient alert and oriented. Resting quietly in bed. Norco given for 6/10 pain with morning medications. VSS. Breakfast eaten. Boot in place on left leg. Maintaining NWB. Bed alarm on. Call light and belongings within reach. Will monitor.      Electronically signed by Jayne Stone RN on 3/27/2023 at 1:17 PM
Patient discharged with belongings and durable medical equipment with family via stretcher via [employer]. IV D/Cd patient tolerated well, no complications. Attempted to call report with no answer. Unable to leave message.      Electronically signed by Marisol Tay RN on 3/27/2023 at 7:42 PM
Patient is resting in bed. Alert and oriented X 4. Complaining of pain, PRN pain medication given per order (see MAR). PM medications administered as ordered without complaint (see eMAR). IV capped and flushed. Assessment complete. Patient uses Stedy. VSS stable at this time. All patient needs are met at this time. Fall precautions are in place. Call light is in reach.
Patient is resting in bed. Alert and oriented X 4. Complaining of pain, PRN pain medication given per order (see MAR). PM medications administered as ordered without complaint (see eMAR). IV capped and flushed. Assessment complete. Patient uses Stedy. VSS stable at this time. All patient needs are met at this time. Fall precautions are in place. Call light is in reach.
Physical Therapy  Facility/Department: [address] ORTHOPEDICS  Physical Therapy Initial Assessment    Name: Juventino Ashton  : 1965  MRN: [id number]  Date of Service: 3/25/2023    Assessment / Discharge Recommendations:  -left ankle fracture NWB  -with boot  -will need continued PTOT and nursing care in a skilled setting with a slow pace     Juventino Ashton scored a 14/24 on the AM-PAC short mobility form. Current research shows that an AM-PAC score of 17 or less is typically not associated with a discharge to the patient's home setting. Based on the patient's AM-PAC score and their current functional mobility deficits, it is recommended that the patient have 3-5 sessions per week of Physical Therapy at d/c to increase the patient's independence. Patient Diagnosis(es): The primary encounter diagnosis was Fall, initial encounter. Diagnoses of Closed head injury, initial encounter, Lightheadedness, Contusion of left elbow, initial encounter, and Closed fracture of left ankle, initial encounter were also pertinent to this visit. Past Medical History:  has a past medical history of Diabetes mellitus ([address] Hyperlipidemia, and Hypertension. Past Surgical History:  has no past surgical history on file. Body Structures, Functions, Activity Limitations Requiring Skilled Therapeutic Intervention: Decreased functional mobility ; Decreased balance; Increased pain;Decreased ADL status; Decreased endurance  Therapy Prognosis: Good  Decision Making: Medium Complexity  Requires PT Follow-Up: Yes  Activity Tolerance  Activity Tolerance: Patient limited by endurance     Plan   Physcial Therapy Plan  General Plan: 3-5 times per week  Current Treatment Recommendations: Functional mobility training, Transfer training, Positioning, Therapeutic activities, Patient/Caregiver education & training, Wheelchair mobility training  Safety Devices  Type of Devices:  All fall risk precautions in place, Call light within
Physical Therapy  Facility/Department: [address] ORTHOPEDICS  Daily Treatment Note  Co Treat with OT    This note serves as patient discharge summary if pt discharges prior to next PT visit        Name: Alek Cline  : 1965  MRN: [id number]  Date of Service: 3/27/2023    Discharge Recommendations:  Patient would benefit from continued therapy after discharge (3-5)   Alek Cline scored a 15/24 on the AM-PAC short mobility form. Current research shows that an AM-PAC score of 17 or less is typically not associated with a discharge to the patient's home setting. Based on the patient's AM-PAC score and their current functional mobility deficits, it is recommended that the patient have 3-5 sessions per week of Physical Therapy at d/c to increase the patient's independence. Please see assessment section for further patient specific details. Patient Diagnosis(es): The primary encounter diagnosis was Fall, initial encounter. Diagnoses of Closed head injury, initial encounter, Lightheadedness, Contusion of left elbow, initial encounter, and Closed fracture of left ankle, initial encounter were also pertinent to this visit. Past Medical History:  has a past medical history of Diabetes mellitus ([facility] [address] Hyperlipidemia, and Hypertension. Past Surgical History:  has no past surgical history on file. Assessment   Body Structures, Functions, Activity Limitations Requiring Skilled Therapeutic Intervention: Decreased functional mobility ; Decreased balance; Increased pain;Decreased ADL status; Decreased endurance  Assessment: Prior to fall and L ankle injury, patient reports living alone in [facility] with ~8 steps to enter, and independent in ADLs, IADLS, and gait without device. Patient now NWBng L LE in a boot. Status 3-27-23:  Bed Mobility SBA, Transfers Min-Mod A of 2 and cues.  AMbulates 4' with RW with Min- Mod A of 2, including 1 person monitoring L LE NWBng.  Patient reports having no support at
Pt struggling with jeans to void in urinal. This RN and PCA assisted pt in removing jeans and transferred pt from bed to stretcher. Pt transferred by stretcher to MRI. PT/OT to follow up with this RN once pt returns from MRI. Pt asked for keys and wallet to be left at bedside.
Pt to call this RN when ready to eat. Pt's BS in the 300s, but pt is not hungry at this time. This RN to recheck BS prior to pt eating.
This RN sent secure message to Dr. Alyne Klinefelter to pass along communication findings from Dr. Rissa Brown and neurology.
assessed for rehabilitation services?: Yes  Additional Pertinent Hx: 63 yo male direct admit from Podiatry visit in which pt had a fall d/t dizziness and blurred vision with L distal fibula fx with closed reduction in ED and hitting of head. MRI negative for CVA. PMH: DM, Alcohol use, Neuropathy  Response to previous treatment: Patient with no complaints from previous session  Family / Caregiver Present: No  Referring Practitioner: Vane Peres DPM  Diagnosis: L fibula fx  Subjective  Subjective: Pt met bedside upon arrival and agreeable to OT/PT cotx. Pt does not report any pain. Pt anxious and with many questions regarding discharge- aware of SNF recommendation d/t inability to manage LLE NWB- social work made aware. General Comment  Comments: RN ok to see     Social/Functional History  Social/Functional History  Lives With: Alone  Type of Home: Condo  Home Layout: One level  Home Access: Stairs to enter with rails  Entrance Stairs - Number of Steps: 4-5  Entrance Stairs - Rails:  (unilateral)  Bathroom Shower/Tub: Tub/Shower unit  Bathroom Toilet: Standard (vanity close)  Has the patient had two or more falls in the past year or any fall with injury in the past year?:  (1 fall brought to hospital)  ADL Assistance: Independent  Homemaking Assistance: Independent  Ambulation Assistance: Independent  Transfer Assistance: Independent  Active : No  Patient's  Info: taxi  Leisure & Hobbies: snacks       Objective      Observation/Palpation  Observation: wearing boot on left ankle. ADL  Additional Comments: Pt declines all ADLs this date. Bed mobility  Supine to Sit: Stand by assistance  Scooting: Stand by assistance  Transfers  Stand Step Transfers: Contact guard assistance (with RW>recliner with assist to maintain NWB for LLE)  Sit to stand: Minimal assistance; Moderate assistance (EOB>RW with assist to keep L LE off of floor)  Stand to sit: Moderate assistance;Minimal assistance
NEUROLOGY    Assessment/Plan:    Active Hospital Problems    Diagnosis     Type 1 diabetes mellitus with hyperglycemia ([facility] [address] [E10.65]     Closed left ankle fracture [S82.892A]     Fall at home, initial encounter [W19. XXXA, Y92.009]     Dizziness [R42]        Dizziness: Multifactorial: Alcohol, medications, stroke, uncontrolled DM  CT head and CT cervical spine showing no evidence of acute finding  MRI brain in a.m.  NIH every shift  Neurology consult  PT, OT     Closed left ankle fracture: Reduced and splinted per podiatry/Ortho-> Dr. Husam Ko. According to her notes: Left lower extremity nonweightbearing  Pain management: Hydrocodone every 6 hours as needed  PT, OT  Consult orthopedic surgery      EtOH dependence: Alcohol level currently nondetected  CIWA assessment every 4 hours for evidence of withdrawal  Initiate Ativan per protocol if there is evidence. Use of Librium would need to be examined for drug to drug interaction: He is currently on Luvox and perphenazine, and high-dose Trileptal     Type I DM with hyperglycemia: CO2 and AG noted  Monitor metabolic panel, beta hydroxybutyrate and hemoglobin A1c in a.m. Continue nightly Lantus regimen, SSI, Accu-Chek, hypoglycemic protocol  Mental health medications: Continued     Hypomagnesia M: 1.6-> repletion initiated  Recheck in a.m. Hypothyroid: Continue  liothyronine and levothyroxine as per outpatient regmien  CAD: Continue aspirin and statin therapy, amlodipine and metoprolol as well        DVT Prophylaxis: Lovenox  Diet: ADULT DIET;  Regular; 4 carb choices (60 gm/meal)  Code Status: Full Code  PT/OT Eval Status: Independent prior to injury         Radha Toney MD
fracture or traumatic malalignment. CT C-Spine W/O Contrast   Final Result   Head CT: No acute intracranial abnormality detected. Cervical spine CT: No acute fracture or traumatic malalignment. IP CONSULT TO PODIATRY  IP CONSULT TO HOSPITALIST  IP CONSULT TO SOCIAL WORK  IP CONSULT TO NEUROLOGY  IP CONSULT TO HOME CARE NEEDS    Assessment/Plan:    Active Hospital Problems    Diagnosis     Weakness [R53.1]     Type 1 diabetes mellitus with hyperglycemia ([facility] [address] [E10.65]     Closed left ankle fracture [S82.892A]     Fall at home, initial encounter [W19. XXXA, Y92.009]     Dizziness [R42]        Dizziness: Multifactorial: Alcohol, medications, stroke, uncontrolled DM  CT head and CT cervical spine showing no evidence of acute finding  MRI brain is w/o acute findings  NIH every shift  Neurology noted, symptoms most likely related to alcohol abuse  PT, OT recommending SNF     Closed left ankle fracture: Reduced and splinted per podiatry/Ortho-> Dr. Richi Enriquez. According to her notes: Left lower extremity nonweightbearing  Pain management: Hydrocodone every 6 hours as needed  PT, OT     EtOH dependence: Alcohol level currently nondetected  CIWA assessment every 4 hours for evidence of withdrawal  Initiate Ativan per protocol if there is evidence. Use of Librium would need to be examined for drug to drug interaction: He is currently on Luvox and perphenazine, and high-dose Trileptal     Type I DM with hyperglycemia: CO2 and AG noted  Monitor metabolic panel, beta hydroxybutyrate and hemoglobin A1c in a.m. Continue nightly Lantus regimen, SSI, Accu-Chek, hypoglycemic protocol  Mental health medications: Continued     Hypomagnesia M: 1.6-> repletion initiated  Recheck in a.m. Hypothyroid: Continue  liothyronine and levothyroxine as per outpatient regmien  CAD: Continue aspirin and statin therapy, amlodipine and metoprolol as well        DVT Prophylaxis: Lovenox  Diet: ADULT DIET;  Regular; 4 carb
for further skilled OT prior to safe return home- pt considering this  Education Method: Demonstration;Verbal  Barriers to Learning: Cognition  Education Outcome: Verbalized understanding;Continued education needed                      AM-PAC Score        AM-PAC Inpatient Daily Activity Raw Score: 16 (03/25/23 1202)  AM-PAC Inpatient ADL T-Scale Score : 35.96 (03/25/23 1202)  ADL Inpatient CMS 0-100% Score: 53.32 (03/25/23 1202)  ADL Inpatient CMS G-Code Modifier : CK (03/25/23 1202)    Goals  Short Term Goals  Time Frame for Short Term Goals: prior to d/c  Short Term Goal 1: ADL tx Min A with LLE NWB  Short Term Goal 2: UB bathing/dressing set up  Short Term Goal 3: LB dressing CGA  Short Term Goal 4: BUE exercises in all planes to improve strength and endurance for fxl tx and ADL  Short Term Goal 5: toileting Min A  Patient Goals   Patient goals : return home to help my animals       Therapy Time   Individual Concurrent Group Co-treatment   Time In 1055         Time Out 1150         Minutes 55         Timed Code Treatment Minutes: 40 Minutes (15 eval. 40 TA)       HENRY Leija, OTR/L

## 2023-03-27 NOTE — PLAN OF CARE
Problem: Discharge Planning  Goal: Discharge to home or other facility with appropriate resources  Outcome: Progressing  Flowsheets (Taken 3/27/2023 1314)  Discharge to home or other facility with appropriate resources:   Identify barriers to discharge with patient and caregiver   Identify discharge learning needs (meds, wound care, etc)     Problem: Pain  Goal: Verbalizes/displays adequate comfort level or baseline comfort level  3/27/2023 1314 by Rodrick Sampson RN  Outcome: Progressing  Flowsheets (Taken 3/27/2023 1314)  Verbalizes/displays adequate comfort level or baseline comfort level:   Encourage patient to monitor pain and request assistance   Assess pain using appropriate pain scale     Problem: Safety - Adult  Goal: Free from fall injury  3/27/2023 1314 by Rodrick Sampson RN  Outcome: Progressing  Flowsheets (Taken 3/27/2023 1314)  Free From Fall Injury: Instruct family/caregiver on patient safety     Problem: Skin/Tissue Integrity  Goal: Absence of new skin breakdown  Description: 1. Monitor for areas of redness and/or skin breakdown  2. Assess vascular access sites hourly  3. Every 4-6 hours minimum:  Change oxygen saturation probe site  4. Every 4-6 hours:  If on nasal continuous positive airway pressure, respiratory therapy assess nares and determine need for appliance change or resting period.   Outcome: Progressing     Problem: ABCDS Injury Assessment  Goal: Absence of physical injury  Outcome: Progressing  Flowsheets (Taken 3/26/2023 5190 by Angélica Allen)  Absence of Physical Injury: Implement safety measures based on patient assessment

## 2023-03-27 NOTE — DISCHARGE SUMMARY
by mouth every 6 hours as needed for Pain for up to 3 days. Max Daily Amount: 4 tablets  Qty: 12 tablet, Refills: 0    Comments: Reduce doses taken as pain becomes manageable  Associated Diagnoses: Closed fracture of left ankle, initial encounter                Details   !! OXcarbazepine (TRILEPTAL) 600 MG tablet Take 600 mg by mouth every morning      !! OXcarbazepine (TRILEPTAL) 300 MG tablet Take 1,200 mg by mouth nightly      amLODIPine (NORVASC) 10 MG tablet Take 10 mg by mouth daily      atorvastatin (LIPITOR) 40 MG tablet Take 40 mg by mouth daily      finasteride (PROSCAR) 5 MG tablet Take 5 mg by mouth daily      fluvoxaMINE (LUVOX) 100 MG tablet Take 300 mg by mouth nightly      LANTUS 100 UNIT/ML injection vial Inject 40 Units into the skin nightly      levothyroxine (SYNTHROID) 150 MCG tablet Take 150 mcg by mouth daily      liothyronine (CYTOMEL) 25 MCG tablet Take 12.5 mcg by mouth daily      metoprolol succinate (TOPROL XL) 50 MG extended release tablet Take 50 mg by mouth daily      perphenazine 2 MG tablet Take 2 mg by mouth 2 times daily      tamsulosin (FLOMAX) 0.4 MG capsule Take 0.4 mg by mouth nightly       !! - Potential duplicate medications found. Please discuss with provider. Time Spent on discharge: 33 min  in the examination, evaluation, counseling and review of medications and discharge plan. Signed:    Emily Nance MD   3/27/2023      Thank you Jose Burkett for the opportunity to be involved in this patient's care. If you have any questions or concerns, please feel free to contact me at [phone].

## 2023-03-27 NOTE — CARE COORDINATION
03/27/23 1132   IMM Letter   IMM Letter given to Patient/Family/Significant other/Guardian/POA/by: given to patient  hs   IMM Letter date given: 03/27/23   IMM Letter time given: 1122     IMM letter provide to patient. This is technically his first IMM letter as it was not given upon admission. This IMM letter should be his follow up IMM prior to discharge.      Electronically signed by MÓNICA Paul, ANA, Case Management on 3/27/2023 at 11:47 AM  [facility] 28-64-27-85
03/27/23 1504   IMM Letter   Observation Status Letter date given: 03/27/23   Observation Status Letter time given: 0831
[address] is out of network for [facility]. Made additional referrals to in network snfs in area--Marito Carrero and [facility].      Electronically signed by MÓNICA Romero, ANA, Case Management on 3/27/2023 at 1:24 PM  [facility] 28-64-27-85
Discharge Planning:  SW spoke with pt as pt requested to speak with SW re: concerns about finances. Pt stated he has concerns about being able to pay his medical bills. SW explained to pt that he can apply for financial assistance once he receives his medical bills. Pt stated he felt more at ease knowing this and has done this in the past.      No other concerns noted at this time.    MÓNICA Dailey  [phone]  Electronically signed by COLTON Henry on 3/25/2023 at 11:10 AM
[facility] authorization received via portal:    Payor approval ID:      Ba Approval ID:  [id number]    Service - Location:  [address] [place] [address] SNF    Dates Approved: 3/27 - 3/29/23    NRD:  3/29/23    Aide Peña Sr.  Administrative Assist, Case Management  [phone]  Electronically signed by Aide Peña on 3/27/2023 at 2:40 PM
KODI Consult:   Patient very anxious about staying in the hospital.  KODI met with patient and discussed that he left the door to his condo open, unlocked. He also left a space heater on in a bedroom where he keeps his snakes. Advised patient I would call [place] [address] police to go to the residence and turn off space heater and lock door if able. [address] [phone]- spoke with Diamond Graham, she spoke with Godwin Ceballos and they will have officer respond and unplug space heater and lock door if able. KODI spoke with patient and discussed him  Therapy evaluating him tomorrow to see if he is safe to return home to his condo- has [address] his condo. Will be non weight bearing on his left ankle.
Patient is very concerned about the financial responsibility he might face with this hospitalization. Encouraged patient to be proactive and work with financial counseling here at the hospital and to call his insurance [facility], [facility] [place] [facility]) regarding costs for SNF versus HC. Unfortunately, as with any insurance, patient will face  co-pays/co-insurance, with an out of pocket max. Patient explained he is facing bankrupty and also has pets at home that need tending to. Patient is highly motivated to return home at discharge due to these factors. Encouraged patient to discuss his medical bills with his  that he is working with regarding his bankrupty.      Electronically signed by Adiel Merlos RN MSN on 3/25/2023 at 11:58 AM
Received call from Stephenie at [facility] may have a semi-private room. Received call from [facility] have a private room. Spoke with patient regarding above. He is agreeable to [facility]. DISCHARGE SUMMARY     DATE OF DISCHARGE: 3/27/2023    DISCHARGE DESTINATION: skilled facility    FACILITY:   Discharging to Facility/ Agency   Name: [facility]  Address:  [place] [address] [place] [address]   Phone:  [phone]  Fax:  [phone]    [address]: yes    HENS/PASAAR COMPLETED: yes    COVID RESULT: pending      TRANSPORTATION:     Company Name:  [address] [place] [address] up Time: 730pm    Phone Number: [phone]      COMMENTS: Informed patient of discharge today and transport time. Left message for Carole at [facility] regarding transport time. Rn aware. Report number [phone].     Electronically signed by MÓNICA Vanegas, LISW, Case Management on 3/27/2023 at 3:54 PM  [place] 28-64-27-85
of provider and agrees with the discharge plan. Freedom of choice list with basic dialogue that supports the patient's individualized plan of care/goals and shares the quality data associated with the providers was provided to: Patient     The Patient and/or Patient Representative Agree with the Discharge Plan?  Yes    Electronically signed by MÓNICA Lira, RENEW, Case Management on 3/27/2023 at 11:54 AM  [facility] 28-64-27-85

## 2023-03-27 NOTE — PLAN OF CARE
Problem: Pain  Goal: Verbalizes/displays adequate comfort level or baseline comfort level  3/27/2023 0002 by Awilda Roberson  Outcome: Progressing  3/26/2023 1043 by Jackie Marmolejo RN  Outcome: Progressing     Problem: Safety - Adult  Goal: Free from fall injury  3/27/2023 0002 by Awilda Roberson  Outcome: Progressing  Flowsheets (Taken 3/26/2023 6176)  Free From Fall Injury: Instruct family/caregiver on patient safety  3/26/2023 1043 by Jackie Marmolejo RN  Outcome: Progressing

## 2023-04-05 ENCOUNTER — ANESTHESIA EVENT (OUTPATIENT)
Dept: OPERATING ROOM | Age: 58
End: 2023-04-05
Payer: MEDICARE

## 2023-04-05 RX ORDER — OXCARBAZEPINE 600 MG/1
600 TABLET, FILM COATED ORAL NIGHTLY
COMMUNITY

## 2023-04-05 RX ORDER — INSULIN ASPART 100 [IU]/ML
INJECTION, SOLUTION INTRAVENOUS; SUBCUTANEOUS 3 TIMES DAILY
COMMUNITY

## 2023-04-05 RX ORDER — OXCARBAZEPINE 600 MG/1
600 TABLET, FILM COATED ORAL DAILY
COMMUNITY

## 2023-04-05 RX ORDER — HYDROCODONE BITARTRATE AND ACETAMINOPHEN 5; 325 MG/1; MG/1
1 TABLET ORAL EVERY 6 HOURS PRN
COMMUNITY

## 2023-04-05 NOTE — FLOWSHEET NOTE
DOS 23   65    RN spoke with Eulogio Chawla, the nursing supervisor at [place]. [phone]    H&P: She is trying to arrange for the ECF  To do the pre-op H&P. It will either be 23 or the morning of the procedure. RN spoke with [facility] in Dr. Kristy Julian office. She is aware. UPDATE: 23 pre-op H&P done. Scanned into media. Medically cleared for surgery. Transportation.:  To be arranged by [facility]. They will NOT have an escort. He is A&Ox4 just very anxious. His sister, Roni Macdonald, will be with him. Currently he is using a wheelchair and walker.
of your surgery. This includes a cough, cold, fever, sore throat, nausea,         or vomiting, and diarrhea, etc.   Please notify your surgeon if you experience dizziness, shortness         of breath or blurred vision between now and the time of your surgery. Do not shave your operative site 96 hours prior to surgery. For face and neck surgery, men may use an electric razor 48 hours   prior to surgery. You may shower the night before surgery or the morning of   your surgery with an antibacterial soap. You will need to bring a photo ID and insurance card     If you use a C-pap or Bi-pap machine, please bring your machine with you to the hospital     Our goal is to provide you with excellent care, therefore, visitors will be limited to so that we may focus on providing this care for you. Please contact your surgeon office, if you have any further questions. [facility] phone number:  [phone] [facility] fax number:  [phone]    Please note these are generalized instructions for all surgical cases, you may be provided with more specific instructions according to your surgery.

## 2023-04-06 ENCOUNTER — APPOINTMENT (OUTPATIENT)
Dept: GENERAL RADIOLOGY | Age: 58
End: 2023-04-06
Attending: STUDENT IN AN ORGANIZED HEALTH CARE EDUCATION/TRAINING PROGRAM
Payer: MEDICARE

## 2023-04-06 ENCOUNTER — HOSPITAL ENCOUNTER (OUTPATIENT)
Age: 58
Setting detail: OUTPATIENT SURGERY
Discharge: HOME | End: 2023-04-06
Attending: STUDENT IN AN ORGANIZED HEALTH CARE EDUCATION/TRAINING PROGRAM | Admitting: STUDENT IN AN ORGANIZED HEALTH CARE EDUCATION/TRAINING PROGRAM
Payer: MEDICARE

## 2023-04-06 ENCOUNTER — ANESTHESIA (OUTPATIENT)
Dept: OPERATING ROOM | Age: 58
End: 2023-04-06
Payer: MEDICARE

## 2023-04-06 VITALS
TEMPERATURE: 97.2 F | BODY MASS INDEX: 29.93 KG/M2 | OXYGEN SATURATION: 97 % | WEIGHT: 221 LBS | DIASTOLIC BLOOD PRESSURE: 80 MMHG | SYSTOLIC BLOOD PRESSURE: 124 MMHG | HEIGHT: 72 IN | HEART RATE: 76 BPM | RESPIRATION RATE: 21 BRPM

## 2023-04-06 LAB
GLUCOSE BLD-MCNC: 181 MG/DL (ref 70–99)
GLUCOSE BLD-MCNC: 247 MG/DL (ref 70–99)
PERFORMED ON: ABNORMAL
PERFORMED ON: ABNORMAL

## 2023-04-06 PROCEDURE — 3209999900 FLUORO FOR SURGICAL PROCEDURES

## 2023-04-06 PROCEDURE — 6370000000 HC RX 637 (ALT 250 FOR IP)

## 2023-04-06 PROCEDURE — 3700000000 HC ANESTHESIA ATTENDED CARE: Performed by: STUDENT IN AN ORGANIZED HEALTH CARE EDUCATION/TRAINING PROGRAM

## 2023-04-06 PROCEDURE — 6360000002 HC RX W HCPCS: Performed by: ANESTHESIOLOGY

## 2023-04-06 PROCEDURE — 73610 X-RAY EXAM OF ANKLE: CPT

## 2023-04-06 PROCEDURE — 6370000000 HC RX 637 (ALT 250 FOR IP): Performed by: ANESTHESIOLOGY

## 2023-04-06 PROCEDURE — 2580000003 HC RX 258: Performed by: ANESTHESIOLOGY

## 2023-04-06 PROCEDURE — 73600 X-RAY EXAM OF ANKLE: CPT

## 2023-04-06 PROCEDURE — 2500000003 HC RX 250 WO HCPCS: Performed by: ANESTHESIOLOGY

## 2023-04-06 PROCEDURE — 2500000003 HC RX 250 WO HCPCS: Performed by: STUDENT IN AN ORGANIZED HEALTH CARE EDUCATION/TRAINING PROGRAM

## 2023-04-06 PROCEDURE — C1769 GUIDE WIRE: HCPCS | Performed by: STUDENT IN AN ORGANIZED HEALTH CARE EDUCATION/TRAINING PROGRAM

## 2023-04-06 PROCEDURE — 6360000002 HC RX W HCPCS: Performed by: STUDENT IN AN ORGANIZED HEALTH CARE EDUCATION/TRAINING PROGRAM

## 2023-04-06 PROCEDURE — 2720000010 HC SURG SUPPLY STERILE: Performed by: STUDENT IN AN ORGANIZED HEALTH CARE EDUCATION/TRAINING PROGRAM

## 2023-04-06 PROCEDURE — 2580000003 HC RX 258: Performed by: STUDENT IN AN ORGANIZED HEALTH CARE EDUCATION/TRAINING PROGRAM

## 2023-04-06 PROCEDURE — 7100000001 HC PACU RECOVERY - ADDTL 15 MIN: Performed by: STUDENT IN AN ORGANIZED HEALTH CARE EDUCATION/TRAINING PROGRAM

## 2023-04-06 PROCEDURE — 3600000004 HC SURGERY LEVEL 4 BASE: Performed by: STUDENT IN AN ORGANIZED HEALTH CARE EDUCATION/TRAINING PROGRAM

## 2023-04-06 PROCEDURE — 3600000014 HC SURGERY LEVEL 4 ADDTL 15MIN: Performed by: STUDENT IN AN ORGANIZED HEALTH CARE EDUCATION/TRAINING PROGRAM

## 2023-04-06 PROCEDURE — 3700000001 HC ADD 15 MINUTES (ANESTHESIA): Performed by: STUDENT IN AN ORGANIZED HEALTH CARE EDUCATION/TRAINING PROGRAM

## 2023-04-06 PROCEDURE — 7100000000 HC PACU RECOVERY - FIRST 15 MIN: Performed by: STUDENT IN AN ORGANIZED HEALTH CARE EDUCATION/TRAINING PROGRAM

## 2023-04-06 PROCEDURE — C1713 ANCHOR/SCREW BN/BN,TIS/BN: HCPCS | Performed by: STUDENT IN AN ORGANIZED HEALTH CARE EDUCATION/TRAINING PROGRAM

## 2023-04-06 PROCEDURE — A4217 STERILE WATER/SALINE, 500 ML: HCPCS | Performed by: STUDENT IN AN ORGANIZED HEALTH CARE EDUCATION/TRAINING PROGRAM

## 2023-04-06 PROCEDURE — 2709999900 HC NON-CHARGEABLE SUPPLY: Performed by: STUDENT IN AN ORGANIZED HEALTH CARE EDUCATION/TRAINING PROGRAM

## 2023-04-06 DEVICE — LOCKING SCREW
Type: IMPLANTABLE DEVICE | Site: ANKLE | Status: FUNCTIONAL
Brand: VARIAX

## 2023-04-06 DEVICE — LOCKING SCREW, FULLY THREADED, T10
Type: IMPLANTABLE DEVICE | Site: ANKLE | Status: FUNCTIONAL
Brand: VARIAX

## 2023-04-06 DEVICE — BONE SCREW
Type: IMPLANTABLE DEVICE | Site: ANKLE | Status: FUNCTIONAL
Brand: VARIAX

## 2023-04-06 DEVICE — CANNULATED SCREW
Type: IMPLANTABLE DEVICE | Site: ANKLE | Status: FUNCTIONAL
Brand: ASNIS

## 2023-04-06 DEVICE — DISTAL LATERAL FIBULA PLATE, 6 HOLE
Type: IMPLANTABLE DEVICE | Site: ANKLE | Status: FUNCTIONAL
Brand: VARIAX

## 2023-04-06 DEVICE — GRAFT BNE SUB 5CC VIABLE MTRX COMPRESSIBLE MOLD RDY TO USE: Type: IMPLANTABLE DEVICE | Site: ANKLE | Status: FUNCTIONAL

## 2023-04-06 RX ORDER — MEPERIDINE HYDROCHLORIDE 25 MG/ML
12.5 INJECTION INTRAMUSCULAR; INTRAVENOUS; SUBCUTANEOUS
Status: DISCONTINUED | OUTPATIENT
Start: 2023-04-06 | End: 2023-04-06 | Stop reason: HOSPADM

## 2023-04-06 RX ORDER — PHENYLEPHRINE HCL IN 0.9% NACL 1 MG/10 ML
SYRINGE (ML) INTRAVENOUS PRN
Status: DISCONTINUED | OUTPATIENT
Start: 2023-04-06 | End: 2023-04-06 | Stop reason: SDUPTHER

## 2023-04-06 RX ORDER — SODIUM CHLORIDE 0.9 % (FLUSH) 0.9 %
5-40 SYRINGE (ML) INJECTION EVERY 12 HOURS SCHEDULED
Status: DISCONTINUED | OUTPATIENT
Start: 2023-04-06 | End: 2023-04-06 | Stop reason: HOSPADM

## 2023-04-06 RX ORDER — LORAZEPAM 2 MG/ML
0.5 INJECTION INTRAMUSCULAR
Status: DISCONTINUED | OUTPATIENT
Start: 2023-04-06 | End: 2023-04-06 | Stop reason: HOSPADM

## 2023-04-06 RX ORDER — EPHEDRINE SULFATE/0.9% NACL/PF 50 MG/5 ML
SYRINGE (ML) INTRAVENOUS PRN
Status: DISCONTINUED | OUTPATIENT
Start: 2023-04-06 | End: 2023-04-06 | Stop reason: SDUPTHER

## 2023-04-06 RX ORDER — HALOPERIDOL 5 MG/ML
1 INJECTION INTRAMUSCULAR
Status: DISCONTINUED | OUTPATIENT
Start: 2023-04-06 | End: 2023-04-06 | Stop reason: HOSPADM

## 2023-04-06 RX ORDER — SUCCINYLCHOLINE/SOD CL,ISO/PF 200MG/10ML
SYRINGE (ML) INTRAVENOUS PRN
Status: DISCONTINUED | OUTPATIENT
Start: 2023-04-06 | End: 2023-04-06 | Stop reason: SDUPTHER

## 2023-04-06 RX ORDER — LIDOCAINE HYDROCHLORIDE 10 MG/ML
INJECTION, SOLUTION EPIDURAL; INFILTRATION; INTRACAUDAL; PERINEURAL
Status: COMPLETED | OUTPATIENT
Start: 2023-04-06 | End: 2023-04-06

## 2023-04-06 RX ORDER — DEXAMETHASONE SODIUM PHOSPHATE 4 MG/ML
INJECTION, SOLUTION INTRA-ARTICULAR; INTRALESIONAL; INTRAMUSCULAR; INTRAVENOUS; SOFT TISSUE PRN
Status: DISCONTINUED | OUTPATIENT
Start: 2023-04-06 | End: 2023-04-06 | Stop reason: SDUPTHER

## 2023-04-06 RX ORDER — LIDOCAINE HYDROCHLORIDE 20 MG/ML
INJECTION, SOLUTION EPIDURAL; INFILTRATION; INTRACAUDAL; PERINEURAL PRN
Status: DISCONTINUED | OUTPATIENT
Start: 2023-04-06 | End: 2023-04-06 | Stop reason: SDUPTHER

## 2023-04-06 RX ORDER — SODIUM CHLORIDE 9 MG/ML
INJECTION, SOLUTION INTRAVENOUS PRN
Status: DISCONTINUED | OUTPATIENT
Start: 2023-04-06 | End: 2023-04-06

## 2023-04-06 RX ORDER — ONDANSETRON 2 MG/ML
4 INJECTION INTRAMUSCULAR; INTRAVENOUS
Status: DISCONTINUED | OUTPATIENT
Start: 2023-04-06 | End: 2023-04-06 | Stop reason: HOSPADM

## 2023-04-06 RX ORDER — HYDROCODONE BITARTRATE AND ACETAMINOPHEN 5; 325 MG/1; MG/1
1 TABLET ORAL
Status: COMPLETED | OUTPATIENT
Start: 2023-04-06 | End: 2023-04-06

## 2023-04-06 RX ORDER — SODIUM CHLORIDE 0.9 % (FLUSH) 0.9 %
5-40 SYRINGE (ML) INJECTION PRN
Status: DISCONTINUED | OUTPATIENT
Start: 2023-04-06 | End: 2023-04-06

## 2023-04-06 RX ORDER — BUPIVACAINE HYDROCHLORIDE 5 MG/ML
INJECTION, SOLUTION EPIDURAL; INTRACAUDAL
Status: COMPLETED | OUTPATIENT
Start: 2023-04-06 | End: 2023-04-06

## 2023-04-06 RX ORDER — FENTANYL CITRATE 50 UG/ML
50 INJECTION, SOLUTION INTRAMUSCULAR; INTRAVENOUS EVERY 5 MIN PRN
Status: DISCONTINUED | OUTPATIENT
Start: 2023-04-06 | End: 2023-04-06 | Stop reason: HOSPADM

## 2023-04-06 RX ORDER — FENTANYL CITRATE 50 UG/ML
INJECTION, SOLUTION INTRAMUSCULAR; INTRAVENOUS PRN
Status: DISCONTINUED | OUTPATIENT
Start: 2023-04-06 | End: 2023-04-06 | Stop reason: SDUPTHER

## 2023-04-06 RX ORDER — DIPHENHYDRAMINE HYDROCHLORIDE 50 MG/ML
12.5 INJECTION INTRAMUSCULAR; INTRAVENOUS
Status: DISCONTINUED | OUTPATIENT
Start: 2023-04-06 | End: 2023-04-06 | Stop reason: HOSPADM

## 2023-04-06 RX ORDER — SODIUM CHLORIDE 9 MG/ML
INJECTION, SOLUTION INTRAVENOUS PRN
Status: DISCONTINUED | OUTPATIENT
Start: 2023-04-06 | End: 2023-04-06 | Stop reason: HOSPADM

## 2023-04-06 RX ORDER — SODIUM CHLORIDE 0.9 % (FLUSH) 0.9 %
5-40 SYRINGE (ML) INJECTION PRN
Status: DISCONTINUED | OUTPATIENT
Start: 2023-04-06 | End: 2023-04-06 | Stop reason: HOSPADM

## 2023-04-06 RX ORDER — SODIUM CHLORIDE 0.9 % (FLUSH) 0.9 %
5-40 SYRINGE (ML) INJECTION EVERY 12 HOURS SCHEDULED
Status: DISCONTINUED | OUTPATIENT
Start: 2023-04-06 | End: 2023-04-06

## 2023-04-06 RX ORDER — PROPOFOL 10 MG/ML
INJECTION, EMULSION INTRAVENOUS PRN
Status: DISCONTINUED | OUTPATIENT
Start: 2023-04-06 | End: 2023-04-06 | Stop reason: SDUPTHER

## 2023-04-06 RX ORDER — MAGNESIUM HYDROXIDE 1200 MG/15ML
LIQUID ORAL CONTINUOUS PRN
Status: DISCONTINUED | OUTPATIENT
Start: 2023-04-06 | End: 2023-04-06 | Stop reason: HOSPADM

## 2023-04-06 RX ORDER — GLYCOPYRROLATE 0.2 MG/ML
INJECTION INTRAMUSCULAR; INTRAVENOUS PRN
Status: DISCONTINUED | OUTPATIENT
Start: 2023-04-06 | End: 2023-04-06 | Stop reason: SDUPTHER

## 2023-04-06 RX ORDER — MIDAZOLAM HYDROCHLORIDE 1 MG/ML
INJECTION INTRAMUSCULAR; INTRAVENOUS PRN
Status: DISCONTINUED | OUTPATIENT
Start: 2023-04-06 | End: 2023-04-06 | Stop reason: SDUPTHER

## 2023-04-06 RX ORDER — ONDANSETRON 2 MG/ML
INJECTION INTRAMUSCULAR; INTRAVENOUS PRN
Status: DISCONTINUED | OUTPATIENT
Start: 2023-04-06 | End: 2023-04-06 | Stop reason: SDUPTHER

## 2023-04-06 RX ADMIN — CEFAZOLIN 2000 MG: 2 INJECTION, POWDER, FOR SOLUTION INTRAMUSCULAR; INTRAVENOUS at 15:31

## 2023-04-06 RX ADMIN — LIDOCAINE HYDROCHLORIDE 100 MG: 20 INJECTION, SOLUTION EPIDURAL; INFILTRATION; INTRACAUDAL; PERINEURAL at 15:24

## 2023-04-06 RX ADMIN — DEXAMETHASONE SODIUM PHOSPHATE 4 MG: 4 INJECTION, SOLUTION INTRAMUSCULAR; INTRAVENOUS at 15:35

## 2023-04-06 RX ADMIN — GLYCOPYRROLATE 0.2 MG: 0.2 INJECTION INTRAMUSCULAR; INTRAVENOUS at 16:28

## 2023-04-06 RX ADMIN — HYDROMORPHONE HYDROCHLORIDE 0.25 MG: 1 INJECTION, SOLUTION INTRAMUSCULAR; INTRAVENOUS; SUBCUTANEOUS at 17:45

## 2023-04-06 RX ADMIN — ONDANSETRON 4 MG: 2 INJECTION INTRAMUSCULAR; INTRAVENOUS at 15:35

## 2023-04-06 RX ADMIN — FENTANYL CITRATE 50 MCG: 50 INJECTION INTRAMUSCULAR; INTRAVENOUS at 16:23

## 2023-04-06 RX ADMIN — Medication 10 MG: at 15:33

## 2023-04-06 RX ADMIN — SODIUM CHLORIDE: 9 INJECTION, SOLUTION INTRAVENOUS at 13:43

## 2023-04-06 RX ADMIN — HYDROCODONE BITARTRATE AND ACETAMINOPHEN 1 TABLET: 5; 325 TABLET ORAL at 18:21

## 2023-04-06 RX ADMIN — Medication 10 MG: at 15:31

## 2023-04-06 RX ADMIN — Medication 100 MCG: at 15:35

## 2023-04-06 RX ADMIN — HYDROMORPHONE HYDROCHLORIDE 0.25 MG: 1 INJECTION, SOLUTION INTRAMUSCULAR; INTRAVENOUS; SUBCUTANEOUS at 17:40

## 2023-04-06 RX ADMIN — INSULIN HUMAN 4 UNITS: 100 INJECTION, SOLUTION PARENTERAL at 14:15

## 2023-04-06 RX ADMIN — MIDAZOLAM 2 MG: 1 INJECTION INTRAMUSCULAR; INTRAVENOUS at 15:17

## 2023-04-06 RX ADMIN — FENTANYL CITRATE 50 MCG: 50 INJECTION INTRAMUSCULAR; INTRAVENOUS at 15:21

## 2023-04-06 RX ADMIN — PROPOFOL 100 MG: 10 INJECTION, EMULSION INTRAVENOUS at 16:28

## 2023-04-06 RX ADMIN — FENTANYL CITRATE 50 MCG: 50 INJECTION INTRAMUSCULAR; INTRAVENOUS at 18:06

## 2023-04-06 RX ADMIN — SODIUM CHLORIDE: 9 INJECTION, SOLUTION INTRAVENOUS at 17:13

## 2023-04-06 RX ADMIN — Medication 140 MG: at 15:24

## 2023-04-06 RX ADMIN — FENTANYL CITRATE 50 MCG: 50 INJECTION INTRAMUSCULAR; INTRAVENOUS at 17:58

## 2023-04-06 RX ADMIN — Medication 10 MG: at 15:36

## 2023-04-06 RX ADMIN — PROPOFOL 150 MG: 10 INJECTION, EMULSION INTRAVENOUS at 15:24

## 2023-04-06 ASSESSMENT — PAIN DESCRIPTION - FREQUENCY
FREQUENCY: CONTINUOUS

## 2023-04-06 ASSESSMENT — PAIN DESCRIPTION - LOCATION
LOCATION: ANKLE

## 2023-04-06 ASSESSMENT — PAIN - FUNCTIONAL ASSESSMENT
PAIN_FUNCTIONAL_ASSESSMENT: ACTIVITIES ARE NOT PREVENTED
PAIN_FUNCTIONAL_ASSESSMENT: ACTIVITIES ARE NOT PREVENTED
PAIN_FUNCTIONAL_ASSESSMENT: 0-10
PAIN_FUNCTIONAL_ASSESSMENT: PREVENTS OR INTERFERES WITH MANY ACTIVE NOT PASSIVE ACTIVITIES

## 2023-04-06 ASSESSMENT — LIFESTYLE VARIABLES: SMOKING_STATUS: 0

## 2023-04-06 ASSESSMENT — PAIN SCALES - GENERAL
PAINLEVEL_OUTOF10: 7
PAINLEVEL_OUTOF10: 6
PAINLEVEL_OUTOF10: 6
PAINLEVEL_OUTOF10: 5
PAINLEVEL_OUTOF10: 8
PAINLEVEL_OUTOF10: 5

## 2023-04-06 ASSESSMENT — PAIN DESCRIPTION - ORIENTATION
ORIENTATION: LEFT
ORIENTATION: LEFT
ORIENTATION: LEFT;OUTER
ORIENTATION: LEFT

## 2023-04-06 ASSESSMENT — PAIN DESCRIPTION - ONSET
ONSET: ON-GOING

## 2023-04-06 ASSESSMENT — PAIN DESCRIPTION - PAIN TYPE
TYPE: SURGICAL PAIN

## 2023-04-06 ASSESSMENT — PAIN DESCRIPTION - DESCRIPTORS
DESCRIPTORS: ACHING
DESCRIPTORS: ACHING

## 2023-04-06 NOTE — OP NOTE
Operative Note      Patient: Jeffy Ahn  YOB: 1965  MRN: [id number]    Date of Procedure: 4/6/2023    Pre-Op Diagnosis: DISPLACED FRACTURE OF LATERAL MALLEOLUS LEFT FIBULA, LEFT ANKLE PAIN    Post-Op Diagnosis: Same       Procedure(s):  OPEN REDUCTION INTERNAL FIXATION LEFT ANKLE    Surgeon(s):  Megan Fuentes DPM    Assistant:   Surgical Assistant: Abeba Urbina RN; Steve Cummins    Anesthesia: Choice    Estimated Blood Loss (mL): less than 50     Complications: None    Specimens:   * No specimens in log *    Implants:  Implant Name Type Inv.  Item Serial No.  Lot No. LRB No. Used Action   BIO 4 VIABLE BONE MATRIX [facility]   [id number] [employer] DIV_CR [id number] Left 1 Implanted   SCREW BNE L28MM DIA4MM CANC TI SELF DRL ST REGIS PARTIALLY - [id number]  SCREW BNE L28MM DIA4MM CANC TI SELF DRL ST REGIS PARTIALLY [id number] [employer] [facility]  Left 1 Implanted   SCREW BNE L16MM DIA2.7MM CANC TI RENETTA FULL THRD T10 DRV FOR - [id number]  SCREW BNE L16MM DIA2.7MM CANC TI RENETTA FULL THRD T10 DRV FOR [id number] [employer] [facility]  Left 4 Implanted   PLATE BNE [id number] 6 H LAT FIBULAR TI RENETTA VARIAX - [id number]  PLATE BNE B123AT [id number] 6 H LAT FIBULAR TI RENETTA VARIAX [id number] [employer] [facility]  Left 1 Implanted   SCREW BNE L14MM DIA3.5MM CANC TI RENETTA FULL THRD T10 DRV FOR - [id number]  SCREW BNE L14MM DIA3.5MM CANC TI RENETTA FULL THRD T10 DRV FOR [id number] [employer] [facility]  Left 1 Implanted   SCREW BNE L12MM DIA3.5MM CANC TI RENETTA FULL THRD T10 DRV FOR - [id number]  SCREW BNE L12MM DIA3.5MM CANC TI RENETTA FULL THRD T10 DRV FOR [id number] [employer] [facility]  Left 1 Implanted   SCREW BNE L14MM DIA3.5MM SELIN TI ST NONLOCKING FULL THRD - [id number]  SCREW BNE L14MM DIA3.5MM SELIN TI ST NONLOCKING FULL THRD [id number] [employer] [id number]  Left 2 Implanted         Drains: * No LDAs found *    Findings: as expected    Detailed Description of Procedure:   Patient was brought to the operating

## 2023-04-06 NOTE — PROGRESS NOTES
Spoke with [facility] [employer], [place]. Transport to return to hospital to provided transport back to [facility].   will call upon arrival.

## 2023-04-06 NOTE — PROGRESS NOTES
Report called to [facility]; all questions answered. They anticipate return of patient this evening.

## 2023-04-06 NOTE — H&P
No change since H&P done in past 24hr.      Fam Angulo DPM,   [address] [facility] [address]  [place], [address] [place] [address]  [phone] office   [phone] fax   [phone] cell  4/6/2023, 3:00 PM

## 2023-04-06 NOTE — PROGRESS NOTES
Patient admitted to PACU # 8 from OR at 1728 post OPEN REDUCTION INTERNAL FIXATION LEFT ANKLE per Princess Nix DPM.  Attached to PACU monitoring system and report received from anesthesia provider. Patient was reported to be hemodynamically stable during procedure. Patient drowsy on admission and denied pain.

## 2023-04-06 NOTE — DISCHARGE INSTRUCTIONS
Post-Op Discharge Instructions    Fluids and Diet  1. Begin with clear liquids, bouillon, dry toast, soda crackers  2. If NOT nauseated, you may resume a regular diet when you desire    Medications  1. Take prescription medications only as directed  2. If pain is not severe, you may take the non-prescription medication that you normally take. 3. If any side effects or adverse reactions occur, discontinue the medication and contact your doctor. 4. Review the patient drug information leaflet, when provided, before you begin taking the medication    Ambulation and Activity  1. You are advised to go directly home from the hospital  2. Use the prescribed walking aids and Wheelchair. 3. Non-weightbearing to L foot. 4. Do not lift or move heavy objects  5. Do not Drive    Post Anesthesia Instructions  1. Do not drive or operate machinery  2. Do not consume alcohol, tranquilizers, sleeping medications, or a non-prescription pain medication  3. Do not make important decisions  4. You should have someone home with you tonight    Care of the Operative Site  1. Keep cast or bandage clean, dry, and intact  2. Do not shower  3. Do not remove bandage  4. Elevate Operative extremity as much as possible to reduce swelling and discomfort for the first 72 hours  5. Apply ice bag wrapped in thick towel over bandage 20 minutes of every hour for the first 72 hours while awake  6. Do not attempt to put anything between the cast or dressing and skin, some itching is normal    Special Instructions: Call doctor immediately if you develop any of the followin. Fever over 100 degrees by mouth - take your temperature daily  2. Pain not relieved by medication ordered  3. Swelling, increased redness, warmth, or hardness around operative area  4. Numb, tingling or cold toes  5. Toe(s) become white or bluish  6. Bandage becomes wet, soiled, or blood soaked (a small amount of bleeding may be normal)  7.  Increasing or progressive

## 2023-04-06 NOTE — ANESTHESIA PRE PROCEDURE
04/20/2012 02:38 PM    PO2ART 106 04/20/2012 02:38 PM    ZWE3HJP 16.8 04/20/2012 02:38 PM    YPV2FED 6.3 04/20/2012 02:38 PM    BEART -20.9 04/20/2012 02:38 PM    P6WIMPMI 97.1 04/20/2012 02:38 PM        Type & Screen (If Applicable):  No results found for: LABABO, LABRH    Drug/Infectious Status (If Applicable):  No results found for: HIV, HEPCAB    COVID-19 Screening (If Applicable):   Lab Results   Component Value Date/Time    COVID19 Not Detected 03/27/2023 04:53 PM           Anesthesia Evaluation  Patient summary reviewed no history of anesthetic complications:   Airway: Mallampati: IV  TM distance: >3 FB   Neck ROM: full  Comment: Large neck circumference  Mouth opening: > = 3 FB   Dental:          Pulmonary:   (+) sleep apnea:      (-) not a current smoker                           Cardiovascular:    (+) hypertension:, CAD (3v CABG in past):, CABG/stent:, FREDERICK:,     (-) dysrhythmias and  angina                Neuro/Psych:   (+) seizures (seizure in past 'many years ago'):, psychiatric history:   (-) CVA           GI/Hepatic/Renal:             Endo/Other:    (+) DiabetesType I DM, using insulin, . Abdominal:   (+) obese,           Vascular: Other Findings:           Anesthesia Plan      general     ASA 3       Induction: intravenous. Anesthetic plan and risks discussed with patient. Plan discussed with CRNA. This pre-anesthesia assessment may be used as a history and physical.    DOS STAFF ADDENDUM:    Pt seen and examined, chart reviewed (including anesthesia, drug and allergy history). No interval changes to history and physical examination. Anesthetic plan, risks, benefits, alternatives, and personnel involved discussed with patient. Patient verbalized an understanding and agrees to proceed.       Logan Huerta MD  April 6, 2023  2:00 PM

## 2023-04-06 NOTE — PROGRESS NOTES
Patient admitted to PACU # 8 from OR at 1727 post open reduction internal fixation left ankle per Dr Simeon Self. Attached to PACU monitoring system and report received from anesthesia provider. Patient was reported to be hemodynamically stable during procedure. Patient drowsy on admission and denied pain.

## 2023-04-06 NOTE — PROGRESS NOTES
Report called to SNF, and patient handed off to transportation staff. Patient instructed and verbalizes understanding to call the doctor listed if they should have any complications or worsening symptoms. Verbalizes understanding about follow-up appointments. D/C IV patient tolerated well, no signs of bleeding. Patient discharged home with all belongings. Out via wheelchair.

## 2023-04-06 NOTE — ANESTHESIA POSTPROCEDURE EVALUATION
Department of Anesthesiology  Postprocedure Note    Patient: Carmenza Huston  MRN: [id number]  YOB: 1965  Date of evaluation: 4/6/2023      Procedure Summary     Date: 04/06/23 Room / Location: [address] [place] [address],[address] [place] [address] / [facility]    Anesthesia Start: 5935 Anesthesia Stop: 8944    Procedure: OPEN REDUCTION INTERNAL FIXATION LEFT ANKLE (Left) Diagnosis:       Closed displaced fracture of lateral malleolus of left fibula with routine healing      Left ankle pain, unspecified chronicity      (DISPLACED FRACTURE OF LATERAL MALLEOLUS LEFT FIBULA, LEFT ANKLE PAIN)    Surgeons: Vivek Avendano DPM Responsible Provider: Dominic Adams MD    Anesthesia Type: general ASA Status: 3          Anesthesia Type: No value filed.     Nichole Phase I: Nichole Score: 8    Nichole Phase II:        Anesthesia Post Evaluation    Patient location during evaluation: PACU  Patient participation: complete - patient participated  Level of consciousness: awake and alert  Pain score: 5  Nausea & Vomiting: no nausea  Complications: no  Cardiovascular status: hemodynamically stable  Respiratory status: acceptable  Hydration status: stable

## 2023-05-02 ENCOUNTER — HOSPITAL ENCOUNTER (OUTPATIENT)
Dept: GENERAL RADIOLOGY | Age: 58
Discharge: HOME | End: 2023-05-02
Payer: MEDICARE

## 2023-05-02 ENCOUNTER — HOSPITAL ENCOUNTER (OUTPATIENT)
Age: 58
Discharge: HOME | End: 2023-05-02
Payer: MEDICARE

## 2023-05-02 DIAGNOSIS — S82.65XS CLOSED NONDISPLACED FRACTURE OF LATERAL MALLEOLUS OF LEFT FIBULA, SEQUELA: ICD-10-CM

## 2023-05-02 PROCEDURE — 73610 X-RAY EXAM OF ANKLE: CPT

## (undated) DEVICE — HYPODERMIC SAFETY NEEDLE: Brand: MAGELLAN

## (undated) DEVICE — NEEDLE HYPO 18GA L1.5IN THN WALL PIVOTING SHLD BVL ORIENTED

## (undated) DEVICE — COTTON UNDERCAST PADDING,CRIMPED FINISH: Brand: WEBRIL

## (undated) DEVICE — GLOVE SURG SZ 75 CRM LTX FREE POLYISOPRENE POLYMER BEAD ANTI

## (undated) DEVICE — BANDAGE,GAUZE,CONFORMING,3"X75",STRL,LF: Brand: MEDLINE

## (undated) DEVICE — DRESSING,GAUZE,XEROFORM,CURAD,1"X8",ST: Brand: CURAD

## (undated) DEVICE — BANDAGE COMPR W4INXL12FT E DISP ESMARCH EVEN

## (undated) DEVICE — PADDING UNDERCAST W4INXL4YD 100% COT CRIMPED FINISH WBRL II

## (undated) DEVICE — C-ARMOR C-ARM EQUIPMENT COVERS CLEAR STERILE UNIVERSAL FIT 12 PER CASE: Brand: C-ARMOR

## (undated) DEVICE — CANNULATED DRILL: Brand: FIXOS

## (undated) DEVICE — SUTURE VCRL + SZ 3-0 L18IN ABSRB UD SH 1/2 CIR TAPERCUT NDL VCP864D

## (undated) DEVICE — MERCY HEALTH WEST TURNOVER: Brand: MEDLINE INDUSTRIES, INC.

## (undated) DEVICE — STATION ISOLATN W1775XH205IN D675IN ICU WALL OR GLS MT P2

## (undated) DEVICE — LOCKING SCREW
Type: IMPLANTABLE DEVICE | Site: ANKLE | Status: NON-FUNCTIONAL
Brand: VARIAX
Removed: 2023-04-06

## (undated) DEVICE — ZIMMER® STERILE DISPOSABLE TOURNIQUET CUFF WITH PLC, DUAL PORT, SINGLE BLADDER, 18 IN. (46 CM)

## (undated) DEVICE — MAJOR SET UP: Brand: MEDLINE INDUSTRIES, INC.

## (undated) DEVICE — T-DRAPE,EXTREMITY,STERILE: Brand: MEDLINE

## (undated) DEVICE — HOLDING PIN: Brand: ANCHORAGE

## (undated) DEVICE — SYRINGE 20ML LL S/C 50

## (undated) DEVICE — GOWN,SIRUS,POLYRNF,BRTHSLV,XL,30/CS: Brand: MEDLINE

## (undated) DEVICE — UNTHREADED GUIDE WIRE: Brand: FIXOS

## (undated) DEVICE — BANDAGE COMPR W6INXL10YD ST M E WHITE/BEIGE

## (undated) DEVICE — PAD,ABDOMINAL,8"X7.5",STERILE,LF,1/PK: Brand: MEDLINE

## (undated) DEVICE — SYRINGE MED 10ML LUERLOCK TIP W/O SFTY DISP

## (undated) DEVICE — DRILL BIT, AO DIA2.0MM X 135MM, SCALED: Brand: VARIAX

## (undated) DEVICE — PADDING CAST W4INXL4YD SPUN DACRON POLY POR SYN VERSATILE

## (undated) DEVICE — SYRINGE MED 3ML CLR PLAS STD N CTRL LUERLOCK TIP DISP

## (undated) DEVICE — CONTAINER SPEC 165OZ POLYPR PATH SNAP LOK CAP W/ LID

## (undated) DEVICE — 3M™ COBAN™ NL STERILE NON-LATEX SELF-ADHERENT WRAP, 2084S, 4 IN X 5 YD (10 CM X 4,5 M), 18 ROLLS/CASE: Brand: 3M™ COBAN™

## (undated) DEVICE — SPEEDGUIDE DRILL AO: Brand: VARIAX

## (undated) DEVICE — APPLICATOR MEDICATED 26 CC SOLUTION HI LT ORNG CHLORAPREP

## (undated) DEVICE — C-ARM: Brand: UNBRANDED

## (undated) DEVICE — ELECTRODE PT RET AD L9FT HI MOIST COND ADH HYDRGEL CORDED

## (undated) DEVICE — GLOVE SURG SZ 8 CRM LTX FREE POLYISOPRENE POLYMER BEAD ANTI

## (undated) DEVICE — CANNULATED COUNTERSINK

## (undated) DEVICE — COVER LT HNDL BLU PLAS